# Patient Record
Sex: MALE
[De-identification: names, ages, dates, MRNs, and addresses within clinical notes are randomized per-mention and may not be internally consistent; named-entity substitution may affect disease eponyms.]

---

## 2024-02-28 PROBLEM — Z00.00 ENCOUNTER FOR PREVENTIVE HEALTH EXAMINATION: Status: ACTIVE | Noted: 2024-02-28

## 2024-03-19 ENCOUNTER — APPOINTMENT (OUTPATIENT)
Dept: OTHER | Facility: CLINIC | Age: 84
End: 2024-03-19
Payer: COMMERCIAL

## 2024-03-19 DIAGNOSIS — Z04.9 ENCOUNTER FOR EXAMINATION AND OBSERVATION FOR UNSPECIFIED REASON: ICD-10-CM

## 2024-03-19 DIAGNOSIS — Z87.891 PERSONAL HISTORY OF NICOTINE DEPENDENCE: ICD-10-CM

## 2024-03-19 PROCEDURE — 99387 INIT PM E/M NEW PAT 65+ YRS: CPT | Mod: 95

## 2024-03-19 RX ORDER — ICOSAPENT ETHYL 500 MG/1
0.5 CAPSULE ORAL
Refills: 0 | Status: ACTIVE | COMMUNITY
Start: 2024-03-19

## 2024-03-19 RX ORDER — ROSUVASTATIN CALCIUM 10 MG/1
10 TABLET, FILM COATED ORAL
Refills: 0 | Status: ACTIVE | COMMUNITY
Start: 2024-03-19

## 2024-03-19 RX ORDER — TAMSULOSIN HYDROCHLORIDE 0.4 MG/1
0.4 CAPSULE ORAL
Refills: 0 | Status: ACTIVE | COMMUNITY
Start: 2024-03-19

## 2024-03-19 RX ORDER — ENZALUTAMIDE 40 MG/1
40 CAPSULE ORAL
Refills: 0 | Status: ACTIVE | COMMUNITY
Start: 2024-03-19

## 2024-03-19 RX ORDER — PREDNISONE 50 MG/1
50 TABLET ORAL
Refills: 0 | Status: ACTIVE | COMMUNITY
Start: 2024-03-19

## 2025-05-14 ENCOUNTER — INPATIENT (INPATIENT)
Facility: HOSPITAL | Age: 85
LOS: 4 days | Discharge: HOME | DRG: 684 | End: 2025-05-19
Attending: INTERNAL MEDICINE | Admitting: INTERNAL MEDICINE
Payer: MEDICARE

## 2025-05-14 VITALS
SYSTOLIC BLOOD PRESSURE: 136 MMHG | HEIGHT: 71 IN | TEMPERATURE: 97 F | RESPIRATION RATE: 19 BRPM | HEART RATE: 76 BPM | WEIGHT: 139.99 LBS | OXYGEN SATURATION: 100 % | DIASTOLIC BLOOD PRESSURE: 80 MMHG

## 2025-05-14 DIAGNOSIS — N17.9 ACUTE KIDNEY FAILURE, UNSPECIFIED: ICD-10-CM

## 2025-05-14 LAB
ALBUMIN SERPL ELPH-MCNC: 3.9 G/DL — SIGNIFICANT CHANGE UP (ref 3.3–5)
ALP SERPL-CCNC: 388 U/L — HIGH (ref 40–120)
ALT FLD-CCNC: <5 U/L — LOW (ref 10–45)
ANION GAP SERPL CALC-SCNC: 15 MMOL/L — SIGNIFICANT CHANGE UP (ref 5–17)
APPEARANCE UR: CLEAR — SIGNIFICANT CHANGE UP
APTT BLD: 31.4 SEC — SIGNIFICANT CHANGE UP (ref 26.1–36.8)
AST SERPL-CCNC: 15 U/L — SIGNIFICANT CHANGE UP (ref 10–40)
BACTERIA # UR AUTO: NEGATIVE /HPF — SIGNIFICANT CHANGE UP
BASOPHILS # BLD AUTO: 0.02 K/UL — SIGNIFICANT CHANGE UP (ref 0–0.2)
BASOPHILS NFR BLD AUTO: 0.6 % — SIGNIFICANT CHANGE UP (ref 0–2)
BILIRUB SERPL-MCNC: 0.2 MG/DL — SIGNIFICANT CHANGE UP (ref 0.2–1.2)
BILIRUB UR-MCNC: NEGATIVE — SIGNIFICANT CHANGE UP
BUN SERPL-MCNC: 47 MG/DL — HIGH (ref 7–23)
CALCIUM SERPL-MCNC: 9.6 MG/DL — SIGNIFICANT CHANGE UP (ref 8.4–10.5)
CAST: 0 /LPF — SIGNIFICANT CHANGE UP (ref 0–4)
CHLORIDE SERPL-SCNC: 103 MMOL/L — SIGNIFICANT CHANGE UP (ref 96–108)
CO2 SERPL-SCNC: 22 MMOL/L — SIGNIFICANT CHANGE UP (ref 22–31)
COLOR SPEC: YELLOW — SIGNIFICANT CHANGE UP
CREAT SERPL-MCNC: 1.47 MG/DL — HIGH (ref 0.5–1.3)
DIFF PNL FLD: ABNORMAL
EGFR: 46 ML/MIN/1.73M2 — LOW
EGFR: 46 ML/MIN/1.73M2 — LOW
EOSINOPHIL # BLD AUTO: 0.02 K/UL — SIGNIFICANT CHANGE UP (ref 0–0.5)
EOSINOPHIL NFR BLD AUTO: 0.6 % — SIGNIFICANT CHANGE UP (ref 0–6)
FLUAV AG NPH QL: SIGNIFICANT CHANGE UP
FLUBV AG NPH QL: SIGNIFICANT CHANGE UP
GAS PNL BLDV: SIGNIFICANT CHANGE UP
GLUCOSE SERPL-MCNC: 97 MG/DL — SIGNIFICANT CHANGE UP (ref 70–99)
GLUCOSE UR QL: NEGATIVE MG/DL — SIGNIFICANT CHANGE UP
HCT VFR BLD CALC: 25.2 % — LOW (ref 39–50)
HGB BLD-MCNC: 8.3 G/DL — LOW (ref 13–17)
IMM GRANULOCYTES NFR BLD AUTO: 0.6 % — SIGNIFICANT CHANGE UP (ref 0–0.9)
INR BLD: 0.88 RATIO — SIGNIFICANT CHANGE UP (ref 0.85–1.16)
KETONES UR QL: NEGATIVE MG/DL — SIGNIFICANT CHANGE UP
LEUKOCYTE ESTERASE UR-ACNC: NEGATIVE — SIGNIFICANT CHANGE UP
LYMPHOCYTES # BLD AUTO: 1.11 K/UL — SIGNIFICANT CHANGE UP (ref 1–3.3)
LYMPHOCYTES # BLD AUTO: 31.7 % — SIGNIFICANT CHANGE UP (ref 13–44)
MAGNESIUM SERPL-MCNC: 2.5 MG/DL — SIGNIFICANT CHANGE UP (ref 1.6–2.6)
MCHC RBC-ENTMCNC: 30.9 PG — SIGNIFICANT CHANGE UP (ref 27–34)
MCHC RBC-ENTMCNC: 32.9 G/DL — SIGNIFICANT CHANGE UP (ref 32–36)
MCV RBC AUTO: 93.7 FL — SIGNIFICANT CHANGE UP (ref 80–100)
MONOCYTES # BLD AUTO: 0.35 K/UL — SIGNIFICANT CHANGE UP (ref 0–0.9)
MONOCYTES NFR BLD AUTO: 10 % — SIGNIFICANT CHANGE UP (ref 2–14)
NEUTROPHILS # BLD AUTO: 1.98 K/UL — SIGNIFICANT CHANGE UP (ref 1.8–7.4)
NEUTROPHILS NFR BLD AUTO: 56.5 % — SIGNIFICANT CHANGE UP (ref 43–77)
NITRITE UR-MCNC: NEGATIVE — SIGNIFICANT CHANGE UP
NRBC BLD AUTO-RTO: 0 /100 WBCS — SIGNIFICANT CHANGE UP (ref 0–0)
NT-PROBNP SERPL-SCNC: 1828 PG/ML — HIGH (ref 0–300)
PH UR: 7 — SIGNIFICANT CHANGE UP (ref 5–8)
PHOSPHATE SERPL-MCNC: 2.8 MG/DL — SIGNIFICANT CHANGE UP (ref 2.5–4.5)
PLATELET # BLD AUTO: 107 K/UL — LOW (ref 150–400)
POTASSIUM SERPL-MCNC: 4.3 MMOL/L — SIGNIFICANT CHANGE UP (ref 3.5–5.3)
POTASSIUM SERPL-SCNC: 4.3 MMOL/L — SIGNIFICANT CHANGE UP (ref 3.5–5.3)
PROT SERPL-MCNC: 6.7 G/DL — SIGNIFICANT CHANGE UP (ref 6–8.3)
PROT UR-MCNC: 100 MG/DL
PROTHROM AB SERPL-ACNC: 10 SEC — SIGNIFICANT CHANGE UP (ref 9.9–13.4)
RBC # BLD: 2.69 M/UL — LOW (ref 4.2–5.8)
RBC # FLD: 19 % — HIGH (ref 10.3–14.5)
RBC CASTS # UR COMP ASSIST: 12 /HPF — HIGH (ref 0–4)
RSV RNA NPH QL NAA+NON-PROBE: SIGNIFICANT CHANGE UP
SARS-COV-2 RNA SPEC QL NAA+PROBE: SIGNIFICANT CHANGE UP
SODIUM SERPL-SCNC: 140 MMOL/L — SIGNIFICANT CHANGE UP (ref 135–145)
SOURCE RESPIRATORY: SIGNIFICANT CHANGE UP
SP GR SPEC: 1.02 — SIGNIFICANT CHANGE UP (ref 1–1.03)
SQUAMOUS # UR AUTO: 2 /HPF — SIGNIFICANT CHANGE UP (ref 0–5)
TROPONIN T, HIGH SENSITIVITY RESULT: 43 NG/L — SIGNIFICANT CHANGE UP (ref 0–51)
UROBILINOGEN FLD QL: 0.2 MG/DL — SIGNIFICANT CHANGE UP (ref 0.2–1)
WBC # BLD: 3.5 K/UL — LOW (ref 3.8–10.5)
WBC # FLD AUTO: 3.5 K/UL — LOW (ref 3.8–10.5)
WBC UR QL: 1 /HPF — SIGNIFICANT CHANGE UP (ref 0–5)

## 2025-05-14 PROCEDURE — 93010 ELECTROCARDIOGRAM REPORT: CPT

## 2025-05-14 PROCEDURE — 71046 X-RAY EXAM CHEST 2 VIEWS: CPT | Mod: 26

## 2025-05-14 PROCEDURE — 99285 EMERGENCY DEPT VISIT HI MDM: CPT | Mod: FS

## 2025-05-14 PROCEDURE — 74177 CT ABD & PELVIS W/CONTRAST: CPT | Mod: 26

## 2025-05-14 PROCEDURE — 71275 CT ANGIOGRAPHY CHEST: CPT | Mod: 26

## 2025-05-14 PROCEDURE — 72125 CT NECK SPINE W/O DYE: CPT | Mod: 26

## 2025-05-14 PROCEDURE — 70450 CT HEAD/BRAIN W/O DYE: CPT | Mod: 26

## 2025-05-14 RX ADMIN — Medication 1000 MILLILITER(S): at 23:36

## 2025-05-14 NOTE — ED PROVIDER NOTE - CLINICAL SUMMARY MEDICAL DECISION MAKING FREE TEXT BOX
attending ashley - weakness and sob and falls in ca pt. rule out pe. consider infection. check lytes. ct head and xray chest. likely admit. attending ashley - weakness and sob and falls in ca pt. rule out pe. consider infection. check lytes. ct head and xray chest. likely admit.  I read ekg as nsr rate 74, no st elevation or depression, qtc 446, narrow qrs. normal axis, septal qs.

## 2025-05-14 NOTE — ED ADULT NURSE NOTE - OBJECTIVE STATEMENT
84 y/o male A&Ox3, ambulatory with a walker, bibems from [facility] c/o mechanical fall and weakness, sob. as per pt, fell x2 yesterday and has serosanguineous abrasion on forehead/ top of head and abrasion on left arm. PMH prostate ca, bone ca, HTN, high cholesterol, pt has NKDA. pt denies any chest pain, fevers, chills, N/V/D. pt dressed in hospital gown, bed at lowest position and call bell within reach.

## 2025-05-14 NOTE — ED PROVIDER NOTE - ATTENDING CONTRIBUTION TO CARE
attending ashley - weakness and sob and falls in ca pt. rule out pe. consider infection. check lytes. ct head and xray chest. likely admit.  I read ekg as nsr rate 74, no st elevation or depression, qtc 446, narrow qrs. normal axis, septal qs.  amari on labs  ct to eval for hydro

## 2025-05-14 NOTE — ED ADULT NURSE REASSESSMENT NOTE - NS ED NURSE REASSESS COMMENT FT1
pt brought to bathroom via wheelchair, pt able to ambulate to wheelchair with steady gait and no complaints of dizziness/SOB, no complaints at this time, pending dispo

## 2025-05-14 NOTE — ED ADULT NURSE NOTE - NSFALLRISKINTERV_ED_ALL_ED

## 2025-05-14 NOTE — ED PROVIDER NOTE - OBJECTIVE STATEMENT
85m hx prostate ca and unspecified bone ca pw weakness and falling. pt says he fell x2 yesterday, left leg gave out. pt fell at 1pm yesterday and then again at 3pm yesterday. when fell, didn't have loc. has wounds on head from a fall a month ago that opened up again yesterday. [facility] and they told him to go to the er. no cp, yes sob, no vomiting, no fever. 85m hx prostate ca and unspecified bone ca pw weakness and falling. pt says he fell x2 yesterday, left leg gave out. pt fell at 1pm yesterday and then again at 3pm yesterday. when fell, didn't have loc. has wounds on head from a fall a month ago that opened up again yesterday. [facility] and they told him to go to the er. no cp, yes sob, no vomiting, no fever.    PA Note: 85 year old male with history of prostate cancer with bony mets presents to the ED from [facility] cancer center for evaluation of lethargy and 2 falls.  Patient reports that yesterday fell twice secondary to his left leg giving out.  He reports that he has had issues with the left leg for "a while", yesterday leg gave out causing him to fall.  Reports he scraped his head and his left elbow.  Normally does not walk with any assistive devices but today use a cane as he was scared he may fall again.  He had a scheduled follow-up with his doctors today and discussed falls return to the ED.  He reports that he is felt very tired and fatigued over the last week.  He denies headache, dizziness, chest pain, shortness of breath, abdominal pain nausea or vomiting.  Patient reports that he always has a slight cough and shortness of breath related to smoking.

## 2025-05-14 NOTE — ED PROVIDER NOTE - PHYSICAL EXAMINATION
Gen: Alert, NAD  Head: NC, AT   Eyes: PERRL, EOMI, normal lids/conjunctiva  ENT: normal hearing, patent oropharynx without erythema/exudate, uvula midline  Neck: supple, no tenderness, Trachea midline  Pulm: right basilar crackles  CV: RRR, no M/R/G, 2+ radial and dp pulses bl, no edema  Abd: soft, NT/ND, +BS, no hepatosplenomegaly  Mskel: extremities x4 with normal ROM and no joint effusions. no ctl spine ttp.   Skin: 2 half dollar sized wounds on the top of scalp   Neuro: AAOx3, no sensory/motor deficits, CN 2-12 intact Normal

## 2025-05-14 NOTE — ED PROVIDER NOTE - NS ED ATTENDING STATEMENT MOD
I have seen and examined this patient and fully participated in the care of this patient as the teaching attending.  The service was shared with the ANY.  I reviewed and verified the documentation.

## 2025-05-14 NOTE — ED ADULT TRIAGE NOTE - CHIEF COMPLAINT QUOTE
lethargy and generalized weakness x1 week, fall yesterday denies LOC and AC use  sent from the [facility]   hx of prostate CA

## 2025-05-14 NOTE — ED PROVIDER NOTE - PROGRESS NOTE DETAILS
[facility] for [facility] cancer, mets to the bone currently on radiation therapy, presenting from [facility] with fatigue and recent falls.  Here dimer elevated patient with leukocytosis.  BUN/creatinine elevated 47/1.47.  CT head and CT C-spine negative for acute pathology.  CTA negative for acute PE however did show severe left-sided hydronephrosis and right sided hydronephrosis.  Given this finding CT abdomen pelvis was ordered.  Patient admitted to [facility] for further management of NHAN

## 2025-05-15 DIAGNOSIS — E78.5 HYPERLIPIDEMIA, UNSPECIFIED: ICD-10-CM

## 2025-05-15 DIAGNOSIS — N28.9 DISORDER OF KIDNEY AND URETER, UNSPECIFIED: ICD-10-CM

## 2025-05-15 DIAGNOSIS — N13.9 OBSTRUCTIVE AND REFLUX UROPATHY, UNSPECIFIED: ICD-10-CM

## 2025-05-15 DIAGNOSIS — R93.89 ABNORMAL FINDINGS ON DIAGNOSTIC IMAGING OF OTHER SPECIFIED BODY STRUCTURES: ICD-10-CM
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
## 2025-05-15 DIAGNOSIS — R91.8 OTHER NONSPECIFIC ABNORMAL FINDING OF LUNG FIELD: ICD-10-CM

## 2025-05-15 DIAGNOSIS — C61 MALIGNANT NEOPLASM OF PROSTATE: ICD-10-CM

## 2025-05-15 DIAGNOSIS — I10 ESSENTIAL (PRIMARY) HYPERTENSION: ICD-10-CM

## 2025-05-15 DIAGNOSIS — K59.00 CONSTIPATION, UNSPECIFIED: ICD-10-CM

## 2025-05-15 DIAGNOSIS — Z79.899 OTHER LONG TERM (CURRENT) DRUG THERAPY: ICD-10-CM

## 2025-05-15 DIAGNOSIS — D61.818 OTHER PANCYTOPENIA: ICD-10-CM

## 2025-05-15 DIAGNOSIS — Z29.9 ENCOUNTER FOR PROPHYLACTIC MEASURES, UNSPECIFIED: ICD-10-CM

## 2025-05-15 LAB
A1C WITH ESTIMATED AVERAGE GLUCOSE RESULT: 5.9 % — HIGH (ref 4–5.6)
ALBUMIN SERPL ELPH-MCNC: 2.9 G/DL — LOW (ref 3.3–5)
ALP SERPL-CCNC: 341 U/L — HIGH (ref 40–120)
ALT FLD-CCNC: <5 U/L — LOW (ref 10–45)
ANION GAP SERPL CALC-SCNC: 14 MMOL/L — SIGNIFICANT CHANGE UP (ref 5–17)
AST SERPL-CCNC: 20 U/L — SIGNIFICANT CHANGE UP (ref 10–40)
BASOPHILS # BLD AUTO: 0.01 K/UL — SIGNIFICANT CHANGE UP (ref 0–0.2)
BASOPHILS NFR BLD AUTO: 0.3 % — SIGNIFICANT CHANGE UP (ref 0–2)
BILIRUB SERPL-MCNC: 0.2 MG/DL — SIGNIFICANT CHANGE UP (ref 0.2–1.2)
BUN SERPL-MCNC: 40 MG/DL — HIGH (ref 7–23)
CALCIUM SERPL-MCNC: 8.8 MG/DL — SIGNIFICANT CHANGE UP (ref 8.4–10.5)
CALCIUM SERPL-MCNC: 9 MG/DL — SIGNIFICANT CHANGE UP (ref 8.4–10.5)
CHLORIDE SERPL-SCNC: 107 MMOL/L — SIGNIFICANT CHANGE UP (ref 96–108)
CO2 SERPL-SCNC: 16 MMOL/L — LOW (ref 22–31)
CREAT ?TM UR-MCNC: 24 MG/DL — SIGNIFICANT CHANGE UP
CREAT SERPL-MCNC: 1.26 MG/DL — SIGNIFICANT CHANGE UP (ref 0.5–1.3)
EGFR: 56 ML/MIN/1.73M2 — LOW
EGFR: 56 ML/MIN/1.73M2 — LOW
EOSINOPHIL # BLD AUTO: 0.03 K/UL — SIGNIFICANT CHANGE UP (ref 0–0.5)
EOSINOPHIL NFR BLD AUTO: 0.8 % — SIGNIFICANT CHANGE UP (ref 0–6)
ESTIMATED AVERAGE GLUCOSE: 123 MG/DL — HIGH (ref 68–114)
FERRITIN SERPL-MCNC: 1272 NG/ML — HIGH (ref 30–400)
FOLATE SERPL-MCNC: 5.6 NG/ML — SIGNIFICANT CHANGE UP
GLUCOSE SERPL-MCNC: 98 MG/DL — SIGNIFICANT CHANGE UP (ref 70–99)
HCT VFR BLD CALC: 22.5 % — LOW (ref 39–50)
HGB BLD-MCNC: 7.1 G/DL — LOW (ref 13–17)
IMM GRANULOCYTES NFR BLD AUTO: 1.1 % — HIGH (ref 0–0.9)
IRON SATN MFR SERPL: 37 % — SIGNIFICANT CHANGE UP (ref 16–55)
IRON SATN MFR SERPL: 81 UG/DL — SIGNIFICANT CHANGE UP (ref 45–165)
LEGIONELLA AG UR QL: NEGATIVE — SIGNIFICANT CHANGE UP
LYMPHOCYTES # BLD AUTO: 0.9 K/UL — LOW (ref 1–3.3)
LYMPHOCYTES # BLD AUTO: 25.2 % — SIGNIFICANT CHANGE UP (ref 13–44)
MAGNESIUM SERPL-MCNC: 2.2 MG/DL — SIGNIFICANT CHANGE UP (ref 1.6–2.6)
MCHC RBC-ENTMCNC: 29.7 PG — SIGNIFICANT CHANGE UP (ref 27–34)
MCHC RBC-ENTMCNC: 31.6 G/DL — LOW (ref 32–36)
MCV RBC AUTO: 94.1 FL — SIGNIFICANT CHANGE UP (ref 80–100)
MONOCYTES # BLD AUTO: 0.37 K/UL — SIGNIFICANT CHANGE UP (ref 0–0.9)
MONOCYTES NFR BLD AUTO: 10.4 % — SIGNIFICANT CHANGE UP (ref 2–14)
MRSA PCR RESULT.: SIGNIFICANT CHANGE UP
NEUTROPHILS # BLD AUTO: 2.22 K/UL — SIGNIFICANT CHANGE UP (ref 1.8–7.4)
NEUTROPHILS NFR BLD AUTO: 62.2 % — SIGNIFICANT CHANGE UP (ref 43–77)
NRBC BLD AUTO-RTO: 0 /100 WBCS — SIGNIFICANT CHANGE UP (ref 0–0)
PHOSPHATE SERPL-MCNC: 3 MG/DL — SIGNIFICANT CHANGE UP (ref 2.5–4.5)
PLATELET # BLD AUTO: 86 K/UL — LOW (ref 150–400)
POTASSIUM SERPL-MCNC: 4.3 MMOL/L — SIGNIFICANT CHANGE UP (ref 3.5–5.3)
POTASSIUM SERPL-SCNC: 4.3 MMOL/L — SIGNIFICANT CHANGE UP (ref 3.5–5.3)
PROCALCITONIN SERPL-MCNC: 0.08 NG/ML — SIGNIFICANT CHANGE UP (ref 0.02–0.1)
PROT ?TM UR-MCNC: 28 MG/DL — HIGH (ref 0–12)
PROT SERPL-MCNC: 5.5 G/DL — LOW (ref 6–8.3)
PROT/CREAT UR-RTO: 1.2 RATIO — HIGH (ref 0–0.2)
PTH-INTACT FLD-MCNC: 59 PG/ML — SIGNIFICANT CHANGE UP (ref 15–65)
RBC # BLD: 2.39 M/UL — LOW (ref 4.2–5.8)
RBC # BLD: 2.39 M/UL — LOW (ref 4.2–5.8)
RBC # FLD: 18.7 % — HIGH (ref 10.3–14.5)
RETICS #: 65.7 K/UL — SIGNIFICANT CHANGE UP (ref 25–125)
RETICS/RBC NFR: 2.8 % — HIGH (ref 0.5–2.5)
S AUREUS DNA NOSE QL NAA+PROBE: SIGNIFICANT CHANGE UP
S PNEUM AG UR QL: NEGATIVE — SIGNIFICANT CHANGE UP
SODIUM SERPL-SCNC: 137 MMOL/L — SIGNIFICANT CHANGE UP (ref 135–145)
SODIUM UR-SCNC: 88 MMOL/L — SIGNIFICANT CHANGE UP
TIBC SERPL-MCNC: 219 UG/DL — LOW (ref 220–430)
UIBC SERPL-MCNC: 138 UG/DL — SIGNIFICANT CHANGE UP (ref 110–370)
VIT B12 SERPL-MCNC: >2000 PG/ML — HIGH (ref 232–1245)
WBC # BLD: 3.57 K/UL — LOW (ref 3.8–10.5)
WBC # FLD AUTO: 3.57 K/UL — LOW (ref 3.8–10.5)

## 2025-05-15 PROCEDURE — 99221 1ST HOSP IP/OBS SF/LOW 40: CPT

## 2025-05-15 PROCEDURE — 73070 X-RAY EXAM OF ELBOW: CPT | Mod: 26,LT

## 2025-05-15 PROCEDURE — 99497 ADVNCD CARE PLAN 30 MIN: CPT | Mod: 25

## 2025-05-15 PROCEDURE — 99223 1ST HOSP IP/OBS HIGH 75: CPT | Mod: 25

## 2025-05-15 RX ORDER — ALBUTEROL SULFATE 2.5 MG/3ML
0 VIAL, NEBULIZER (ML) INHALATION
Refills: 0 | DISCHARGE

## 2025-05-15 RX ORDER — MEGESTROL ACETATE 40 MG
1 TABLET ORAL
Refills: 0 | DISCHARGE

## 2025-05-15 RX ORDER — POLYETHYLENE GLYCOL 3350 17 G/17G
17 POWDER, FOR SOLUTION ORAL DAILY
Refills: 0 | Status: DISCONTINUED | OUTPATIENT
Start: 2025-05-15 | End: 2025-05-19

## 2025-05-15 RX ORDER — ROSUVASTATIN CALCIUM 20 MG/1
10 TABLET, FILM COATED ORAL AT BEDTIME
Refills: 0 | Status: DISCONTINUED | OUTPATIENT
Start: 2025-05-15 | End: 2025-05-19

## 2025-05-15 RX ORDER — SENNA 187 MG
2 TABLET ORAL AT BEDTIME
Refills: 0 | Status: DISCONTINUED | OUTPATIENT
Start: 2025-05-15 | End: 2025-05-19

## 2025-05-15 RX ORDER — METFORMIN HYDROCHLORIDE 850 MG/1
1 TABLET ORAL
Refills: 0 | DISCHARGE

## 2025-05-15 RX ORDER — TAMSULOSIN HYDROCHLORIDE 0.4 MG/1
0.4 CAPSULE ORAL
Refills: 0 | Status: DISCONTINUED | OUTPATIENT
Start: 2025-05-15 | End: 2025-05-19

## 2025-05-15 RX ORDER — ROSUVASTATIN CALCIUM 20 MG/1
1 TABLET, FILM COATED ORAL
Refills: 0 | DISCHARGE

## 2025-05-15 RX ORDER — DARBEPOETIN ALFA 40 UG/ML
500 SOLUTION INTRAVENOUS; SUBCUTANEOUS
Refills: 0 | DISCHARGE

## 2025-05-15 RX ORDER — AZITHROMYCIN 250 MG
500 CAPSULE ORAL EVERY 24 HOURS
Refills: 0 | Status: DISCONTINUED | OUTPATIENT
Start: 2025-05-16 | End: 2025-05-17

## 2025-05-15 RX ORDER — AZITHROMYCIN 250 MG
500 CAPSULE ORAL ONCE
Refills: 0 | Status: COMPLETED | OUTPATIENT
Start: 2025-05-15 | End: 2025-05-15

## 2025-05-15 RX ORDER — DARBEPOETIN ALFA 40 UG/ML
0 SOLUTION INTRAVENOUS; SUBCUTANEOUS
Refills: 0 | DISCHARGE

## 2025-05-15 RX ORDER — ENZALUTAMIDE 40 MG/1
1 CAPSULE ORAL
Refills: 0 | DISCHARGE

## 2025-05-15 RX ORDER — CEFTRIAXONE 500 MG/1
1000 INJECTION, POWDER, FOR SOLUTION INTRAMUSCULAR; INTRAVENOUS EVERY 24 HOURS
Refills: 0 | Status: DISCONTINUED | OUTPATIENT
Start: 2025-05-16 | End: 2025-05-19

## 2025-05-15 RX ORDER — AZITHROMYCIN 250 MG
CAPSULE ORAL
Refills: 0 | Status: DISCONTINUED | OUTPATIENT
Start: 2025-05-15 | End: 2025-05-17

## 2025-05-15 RX ORDER — CEFTRIAXONE 500 MG/1
INJECTION, POWDER, FOR SOLUTION INTRAMUSCULAR; INTRAVENOUS
Refills: 0 | Status: DISCONTINUED | OUTPATIENT
Start: 2025-05-15 | End: 2025-05-19

## 2025-05-15 RX ORDER — METOPROLOL SUCCINATE 50 MG/1
1 TABLET, EXTENDED RELEASE ORAL
Refills: 0 | DISCHARGE

## 2025-05-15 RX ORDER — TAMSULOSIN HYDROCHLORIDE 0.4 MG/1
1 CAPSULE ORAL
Refills: 0 | DISCHARGE

## 2025-05-15 RX ORDER — LEUPROLIDE 42 MG/.37G
0 INJECTION, EMULSION SUBCUTANEOUS
Refills: 0 | DISCHARGE

## 2025-05-15 RX ORDER — QUETIAPINE FUMARATE 25 MG/1
1 TABLET ORAL
Refills: 0 | DISCHARGE

## 2025-05-15 RX ORDER — METOPROLOL SUCCINATE 50 MG/1
100 TABLET, EXTENDED RELEASE ORAL DAILY
Refills: 0 | Status: DISCONTINUED | OUTPATIENT
Start: 2025-05-15 | End: 2025-05-19

## 2025-05-15 RX ORDER — PENTOXIFYLLINE 100 %
0 POWDER (GRAM) MISCELLANEOUS
Refills: 0 | DISCHARGE

## 2025-05-15 RX ORDER — CEFTRIAXONE 500 MG/1
1000 INJECTION, POWDER, FOR SOLUTION INTRAMUSCULAR; INTRAVENOUS ONCE
Refills: 0 | Status: COMPLETED | OUTPATIENT
Start: 2025-05-15 | End: 2025-05-15

## 2025-05-15 RX ORDER — MEGESTROL ACETATE 40 MG
40 TABLET ORAL DAILY
Refills: 0 | Status: DISCONTINUED | OUTPATIENT
Start: 2025-05-15 | End: 2025-05-19

## 2025-05-15 RX ORDER — ENZALUTAMIDE 40 MG/1
160 CAPSULE ORAL DAILY
Refills: 0 | Status: DISCONTINUED | OUTPATIENT
Start: 2025-05-15 | End: 2025-05-19

## 2025-05-15 RX ORDER — HEPARIN SODIUM 1000 [USP'U]/ML
5000 INJECTION INTRAVENOUS; SUBCUTANEOUS EVERY 8 HOURS
Refills: 0 | Status: DISCONTINUED | OUTPATIENT
Start: 2025-05-15 | End: 2025-05-19

## 2025-05-15 RX ORDER — LEUPROLIDE 42 MG/.37G
7.5 INJECTION, EMULSION SUBCUTANEOUS
Refills: 0 | DISCHARGE

## 2025-05-15 RX ORDER — ALBUTEROL SULFATE 2.5 MG/3ML
2 VIAL, NEBULIZER (ML) INHALATION
Refills: 0 | DISCHARGE

## 2025-05-15 RX ORDER — PREDNISONE 20 MG/1
1 TABLET ORAL
Refills: 0 | DISCHARGE

## 2025-05-15 RX ORDER — BICALUTAMIDE 50 MG/1
1 TABLET, FILM COATED ORAL
Refills: 0 | DISCHARGE

## 2025-05-15 RX ORDER — TAMSULOSIN HYDROCHLORIDE 0.4 MG/1
0 CAPSULE ORAL
Refills: 0 | DISCHARGE

## 2025-05-15 RX ORDER — ACETAMINOPHEN 500 MG/5ML
650 LIQUID (ML) ORAL EVERY 6 HOURS
Refills: 0 | Status: DISCONTINUED | OUTPATIENT
Start: 2025-05-15 | End: 2025-05-19

## 2025-05-15 RX ORDER — ENZALUTAMIDE 40 MG/1
4 CAPSULE ORAL
Refills: 0 | DISCHARGE

## 2025-05-15 RX ORDER — BICALUTAMIDE 50 MG/1
50 TABLET, FILM COATED ORAL DAILY
Refills: 0 | Status: DISCONTINUED | OUTPATIENT
Start: 2025-05-15 | End: 2025-05-17

## 2025-05-15 RX ORDER — QUETIAPINE FUMARATE 25 MG/1
50 TABLET ORAL AT BEDTIME
Refills: 0 | Status: DISCONTINUED | OUTPATIENT
Start: 2025-05-15 | End: 2025-05-19

## 2025-05-15 RX ADMIN — HEPARIN SODIUM 5000 UNIT(S): 1000 INJECTION INTRAVENOUS; SUBCUTANEOUS at 05:06

## 2025-05-15 RX ADMIN — TAMSULOSIN HYDROCHLORIDE 0.4 MILLIGRAM(S): 0.4 CAPSULE ORAL at 17:05

## 2025-05-15 RX ADMIN — TAMSULOSIN HYDROCHLORIDE 0.4 MILLIGRAM(S): 0.4 CAPSULE ORAL at 05:06

## 2025-05-15 RX ADMIN — HEPARIN SODIUM 5000 UNIT(S): 1000 INJECTION INTRAVENOUS; SUBCUTANEOUS at 13:05

## 2025-05-15 RX ADMIN — HEPARIN SODIUM 5000 UNIT(S): 1000 INJECTION INTRAVENOUS; SUBCUTANEOUS at 21:15

## 2025-05-15 RX ADMIN — ROSUVASTATIN CALCIUM 10 MILLIGRAM(S): 20 TABLET, FILM COATED ORAL at 21:04

## 2025-05-15 RX ADMIN — Medication 250 MILLIGRAM(S): at 04:44

## 2025-05-15 RX ADMIN — METOPROLOL SUCCINATE 100 MILLIGRAM(S): 50 TABLET, EXTENDED RELEASE ORAL at 05:06

## 2025-05-15 RX ADMIN — Medication 2 TABLET(S): at 21:05

## 2025-05-15 RX ADMIN — ENZALUTAMIDE 160 MILLIGRAM(S): 40 CAPSULE ORAL at 21:34

## 2025-05-15 RX ADMIN — Medication 2 TABLET(S): at 04:44

## 2025-05-15 RX ADMIN — POLYETHYLENE GLYCOL 3350 17 GRAM(S): 17 POWDER, FOR SOLUTION ORAL at 04:44

## 2025-05-15 RX ADMIN — CEFTRIAXONE 100 MILLIGRAM(S): 500 INJECTION, POWDER, FOR SOLUTION INTRAMUSCULAR; INTRAVENOUS at 04:44

## 2025-05-15 RX ADMIN — QUETIAPINE FUMARATE 50 MILLIGRAM(S): 25 TABLET ORAL at 21:04

## 2025-05-15 NOTE — H&P ADULT - PROBLEM SELECTOR PLAN 5
CTH/C-spine w/o acute finding  LE strength/sensation intact, no spinal ttp, no urinary/fecal incontinence   - PT eval  - fall precautions  - obtain XR L elbow r/o fx  - monitor exam, consider further imaging to eval LLE giving out

## 2025-05-15 NOTE — PHYSICAL THERAPY INITIAL EVALUATION ADULT - ACTIVE RANGE OF MOTION EXAMINATION, REHAB EVAL
aashish. upper extremity Active ROM was WNL (within normal limits)/bilateral lower extremity Active ROM was WNL (within normal limits)

## 2025-05-15 NOTE — H&P ADULT - PROBLEM SELECTOR PLAN 2
w/o baseline to compare, c/f obstructive etiology as above  proBNP and troponin elevation consider in this context (euvolemic-appearing and w/o CP/palpitations/acute SOB)  - obtain Ustudies, monitor BMP/UOP, dose per renal fn, avoid toxins

## 2025-05-15 NOTE — CONSULT NOTE ADULT - ASSESSMENT
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

## 2025-05-15 NOTE — CONSULT NOTE ADULT - ASSESSMENT
Stage IVB prostate cancer  -- follows with [place] [facility] [place] Aleman  --on Lupron + Xtandi (160mg QD) + Pluvicto  --Recently completed three doses of Pluvicto, tolerating well. 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     Anemia   --2/2 malignancy and treatment  --baseline 7.8-11.2  --no nutritional deficiencies noted on outpatient lab work from 5/14  --s/p Aranesp 500 mcg on 5/14      this note is incomplete    will follow,    Kristal Alvarez NP  Hematology/ Oncology  [facility]  [phone] (office)  [phone] (alt office)  Evenings and weekends please call MD on call or office   85M current smoker (1/2 PPD x 70y),  WTC exposure, HTN, HLD, anemia, osteonecrosis of jaw, adjustment d/o, constipation, epididymo-orchitis, urinary retention, prostate cancer with mets to bone, fall (w/ head trauma), p/w falls    Stage IVB prostate cancer  -- follows with Oncologist at [facility] Dr. Aleman  --on Lupron + Xtandi (160mg QD) + Pluvicto  --Recently completed three doses of Pluvicto, tolerating well. 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

## 2025-05-15 NOTE — H&P ADULT - ASSESSMENT
85M current smoker (1/2 PPD x 70y), WTC exposure, HTN, HLD, anemia, osteonecrosis of jaw, adjustment d/o, constipation, epididymo-orchitis, urinary retention, prostate cancer with mets to bone, fall (w/ head trauma), p/w falls found to have b/l hydronephrosis with c/f bladder mass, abnormal renal fn w/o baseline to compare, and r/o PNA given RLL 1.3 cm nodular consolidative opacity

## 2025-05-15 NOTE — H&P ADULT - PROBLEM SELECTOR PLAN 1
Bilateral hydroureteronephrosis left-greater-than-right which appears secondary to a mass at the base of the bladder. This mass baby either bladder or prostate in origin  - uro and onc c/s in AM  - insert indwelling urinary catheter ON, monitor Is/Os

## 2025-05-15 NOTE — H&P ADULT - PROBLEM SELECTOR PLAN 11
- pt w/o full list of meds, extrapolated from list/Surescripts/outpt record however team will need to contact pharmacy in AM to complete formal med rec (will continue meds which are more certain re dose/frequency/use in interim) - pt w/o full list of meds, extrapolated from list/Surescripts/outpt record however team will need to contact pharmacy in AM to complete formal med rec (will continue meds which are more certain re dose/frequency/use in interim, holding multiple meds ON including bicalutamide, Xtandi)

## 2025-05-15 NOTE — PATIENT PROFILE ADULT - FALL HARM RISK - HARM RISK INTERVENTIONS

## 2025-05-15 NOTE — H&P ADULT - NSHPPHYSICALEXAM_GEN_ALL_CORE
PHYSICAL EXAM:  GENERAL: NAD, well-groomed, well-developed, pleasant to interview  HEAD: Normocephalic +scalp w/ overlying dressing not saturated and nontender to palpation, additional scalp abrasion non-bleeding/nontender  EYES: PERRL, conjunctiva and sclera clear  ENMT: No tonsillar erythema, exudates, or enlargement; Moist mucous membranes  NECK: Supple w/o JVD or pain w/ ROM  NERVOUS SYSTEM: Alert & Oriented X4, Good concentration; Motor Strength 5/5 B/L upper and lower extremities. Sensation equal/intact b/l UE and LE. CN II-XII grossly intact.   CHEST/LUNG: Clear to auscultation bilaterally; No rales, rhonchi, wheezing, or rubs  HEART: Regular rate and rhythm; No murmurs, rubs, or gallops  ABDOMEN: Soft, Nontender, Nondistended; Bowel sounds present. No guarding, rebound tenderness, or rigidity. No CVA tenderness.   EXTREMITIES: 2+ Peripheral Pulses, No edema/warmth/erythema/tenderness to palpation b/l LE   MUSCULOSKELETAL: ecchymosis b/l UEs, R elbow w/o tenderness or pain w/ ROM and bandaged proximal RUE w/o saturation or tenderness. L elbow mild tenderness to palpation, no pain w/ < from: CT Abdomen and Pelvis w/ IV Cont (05.14.25 @ 22:03) >        < end of copied text >

## 2025-05-15 NOTE — H&P ADULT - NSHPADDITIONALINFOADULT_GEN_ALL_CORE
Urgent medical problems addressed overnight, comprehensive care to be assumed by day colleagues during course of admission.    Please refer to detailed radiology reports above, provide to patient upon discharge for presentation to PCP at which time abnormal findings not addressed inpatient can be followed up.    Case assigned to me overnight by hospitalist in charge Dr. Cruz, care to be assumed in AM by primary team Dr. Mario Fitzpatrick

## 2025-05-15 NOTE — CONSULT NOTE ADULT - ASSESSMENT
85M current smoker (1/2 PPD x 70y),  WTC exposure, HTN, HLD, anemia, osteonecrosis of jaw, adjustment d/o, constipation, epididymo-orchitis, urinary retention, prostate cancer with mets to bone, fall (w/ head trauma), p/w falls      85M current smoker (1/2 PPD x 70y),  WTC exposure, HTN, HLD, anemia, osteonecrosis of jaw, adjustment d/o, constipation, epididymo-orchitis, urinary retention, prostate cancer with mets to bone, fall (w/ head trauma), p/w falls  reduction of all 3 cell lines   Demetrius in light of severe hydro   Low HCO3 which can be seen in metabolic acidosis   Wide spread metastatic disease     1 Endo- Check random cortisol level in am to assess for adrenal insufficiency(was on Megace and prednisone)   2 Renal- Renal function did normalize.  He will need   One of the options include suarez and renal scan to assess the functionality of the hydro   Will need cysto  3 ID- IV abx   4 CVS- The lopressor should not be reducing the BP much         [facility]   [phone]

## 2025-05-15 NOTE — H&P ADULT - CONVERSATION DETAILS
Patient is alert and oriented to person, place, time, and situation. Extensively discussed findings and plan of care, all questions answered. With permission, broached topic of GOC introducing terms including CPR, intubation, and mechanical ventilation. Inquired as to whether patient was familiar with such terms, described indications and procedures associated with terms in detail. Patient states that at this time he has not contemplated this decision, and would prefer to discuss with his daughter in AM, and is amenable to palliative c/s to further discuss. He understands that in the interim he will remain FULL CODE, and that the team is available to discuss further should he wish.

## 2025-05-15 NOTE — H&P ADULT - PROBLEM SELECTOR PLAN 3
Right lower lobe 1.3 cm nodular consolidative opacity; this is indeterminate, but may be infectious/inflammatory in etiology  - given immunocompromise will cover w/ CTX, azithro pending Cx/MRSA/legionella/strep and obtain procal (may be confounded by abn renal fn), trend temp/WBC curve  - repeat CT chest outpatient to f/u RLL nodular consolidative opacity, mild scarring R lung base; pulm referral re emphysema (reporting baseline GUTIÉRREZ, cough) can d/w onc

## 2025-05-15 NOTE — H&P ADULT - NSHPLABSRESULTS_GEN_ALL_CORE
< from: CT Angio Chest PE Protocol w/ IV Cont (05.14.25 @ 19:29) >    LUNGS AND LARGE AIRWAYS: Patent central airways. Emphysema. Patchy 1.3 cm   nodular consolidative opacity at the right base (image 356, series 6)  PLEURA: No pleural effusion.  VESSELS: No pulmonary embolism. Aortic calcifications.  HEART: Heart size is normal. No pericardial effusion.  MEDIASTINUM AND CELIA: No lymphadenopathy.  CHEST WALL AND LOWER NECK: Within normal limits.  VISUALIZED UPPER ABDOMEN: Severe left hydronephrosis. Right   hydronephrosis and/or parapelvic cysts. Cholelithiasis.  BONES: Diffuse sclerotic lesions throughout the osseous structures.  IMPRESSION:  No acute pulmonary embolism.  Right lower lobe 1.3 cm nodular consolidative opacity; this is   indeterminate, but may be infectious/inflammatory in etiology. CT chest   follow-up in 3 months recommended to ensure clearing.  Severe left-sided hydronephrosis. Right hydronephrosis and/or parapelvic   cysts. Recommend dedicated CT abdomen and pelvis to further assess.  Diffuse sclerotic lesions throughout the osseous structures; this may   represent metastatic disease.    < from: CT Abdomen and Pelvis w/ IV Cont (05.14.25 @ 22:03) > (unable to fit entire read, please refer to PACS)     IMPRESSION:  1.   Bilateral hydroureteronephrosis left-greater-than-right which appears  secondary to a mass at the base of the bladder. This mass baby either   bladder  or prostate in origin. There is extensive bony metastatic disease.  2.   Abnormal appearance of the rectum which may be secondary to   proctitis or  tumor. Recommend follow-up.    < end of copied text >    EKG NSR 74BPM, QTc 446ms, TWI aVL w/o prior to compare

## 2025-05-15 NOTE — CONSULT NOTE ADULT - SUBJECTIVE AND OBJECTIVE BOX
Reason for consult: prostate ca    HPI:  w/o significant prior info in [place]/HIE to review    85M current smoker (1/2 PPD x 70y),  WTC exposure, HTN, HLD, anemia, osteonecrosis of jaw, adjustment d/o, constipation, epididymo-orchitis, urinary retention, prostate cancer with mets to bone, fall (w/ head trauma), p/w falls. AOx4, reports baseline fatigue, GUTIÉRREZ, chronic cough unchanged x months, in addition to LLE edema x months (reports evaluation outpatient w/ duplex unrevealing), with constipation x4d (passing flatus), when 2d ago while ambulating experienced LLE "giving out" resulting in falls w/o LOC though +head/L elbow trauma, able to crawl to chair and rise w/ ambulation via walker/cane afterward, referred to ED after presentation to scheduled outpatient f/u appt. Not endorsing dizziness, lightheadedness, visual disturbance, numbness (include LE or saddle anesthesia), weakness, (including LEs), tingling, rhinorrhea, odynophagia, CP, palpitations, change in cough/SOB/sputum, abd pain, n/v/d, melena, hematochezia, dysuria, hematuria, change in urinary frequency or odor, urinary/fecal incontinence, or other complaint. Unable to provide full medication list, has few meds in phone photos at times missing frequency/dose. Of note reports fall 1 mo ago for similar reason w/ head trauma, states that he did not report to hospital at that time.     - WBC 3.50, H/H 8.3/25.2 (normocytic), ; DDimer 1040; HST 50 -> 43, proBNP 1828; BUN/SCr 47/1.47 (GFR 46),   - UA protein, nonhemolyzed trace blood (12 RBC); COV/FluAB/RSV not det    CXR:   Clear lungs. Probable bony metastatic disease    CT HEAD:  No acute intracranial hemorrhage, mass effect, or midline shift.    CT CERVICAL SPINE:  No acute fracture or traumatic subluxation.  Multi-level degenerative changes.  Suspected bony metastatic disease.  Known "unspecified bone cancer, "    CTA chest PE protocol IC:   No acute pulmonary embolism.    Right lower lobe 1.3 cm nodular consolidative opacity; this is indeterminate, but may be infectious/inflammatory in etiology. CT chest follow-up in 3 months recommended to ensure clearing.    Severe left-sided hydronephrosis. Right hydronephrosis and/or parapelvic cysts. Recommend dedicated CT abdomen and pelvis to further assess.    Diffuse sclerotic lesions throughout the osseous structures; this may represent metastatic disease.    CT A/P IC (prelim):   1.   Bilateral hydroureteronephrosis left-greater-than-right which appears secondary to a mass at the base of the bladder. This mass baby either bladder or prostate in origin. There is extensive bony metastatic disease.  2.   Abnormal appearance of the rectum which may be secondary to proctitis or tumor. Recommend follow-up.    s/p NS 1L  (15 May 2025 02:43)      PAST MEDICAL & SURGICAL HISTORY:  Hypertension      HLD (hyperlipidemia)      Prostate cancer metastatic to bone      Osteonecrosis of jaw      Anemia      History of adjustment disorder      H/O constipation      Epididymo-orchitis      H/O urinary retention      No significant past surgical history          FAMILY HISTORY:  No pertinent family history in first degree relatives        Alochol: Denied  Smoking: Nonsmoker  Drug Use: Denied  Marital Status:         Allergies    No Known Allergies    Intolerances        MEDICATIONS  (STANDING):  azithromycin  IVPB      cefTRIAXone   IVPB      heparin   Injectable 5000 Unit(s) SubCutaneous every 8 hours  metoprolol succinate  milliGRAM(s) Oral daily  polyethylene glycol 3350 17 Gram(s) Oral daily  rosuvastatin 10 milliGRAM(s) Oral at bedtime  senna 2 Tablet(s) Oral at bedtime  tamsulosin 0.4 milliGRAM(s) Oral two times a day    MEDICATIONS  (PRN):  acetaminophen     Tablet .. 650 milliGRAM(s) Oral every 6 hours PRN Temp greater or equal to 38C (100.4F), Mild Pain (1 - 3)      ROS  No fever, sweats, chills  No epistaxis, HA, sore throat  No CP, SOB, cough, sputum  No n/v/d, abd pain, melena, hematochezia  No edema  No rash  No anxiety  No back pain, joint pain  No bleeding, bruising  No dysuria, hematuria    T(C): 36.5 (05-15-25 @ 04:10), Max: 36.6 (05-14-25 @ 18:47)  HR: 70 (05-15-25 @ 04:10) (70 - 83)  BP: 102/64 (05-15-25 @ 04:10) (102/64 - 147/77)  RR: 18 (05-15-25 @ 04:10) (15 - 19)  SpO2: 97% (05-15-25 @ 04:10) (95% - 100%)  Wt(kg): --    PE  NAD  Awake, alert  Anicteric, MMM  RRR  CTAB  Abd soft, NT, ND  No c/c/e  No rash grossly  FROM                          8.3    3.50  )-----------( 107      ( 14 May 2025 14:35 )             25.2       05-14    140  |  103  |  47[H]  ----------------------------<  97  4.3   |  22  |  1.47[H]    Ca    9.6      14 May 2025 14:35  Phos  2.8     05-14  Mg     2.5     05-14    TPro  6.7  /  Alb  3.9  /  TBili  0.2  /  DBili  x   /  AST  15  /  ALT  <5[L]  /  AlkPhos  388[H]  05-14   Reason for consult: prostate ca    HPI:  w/o significant prior info in [place]/HIE to review    85M current smoker (1/2 PPD x 70y),  WTC exposure, HTN, HLD, anemia, osteonecrosis of jaw, adjustment d/o, constipation, epididymo-orchitis, urinary retention, prostate cancer with mets to bone, fall (w/ head trauma), p/w falls. AOx4, reports baseline fatigue, GUTIÉRREZ, chronic cough unchanged x months, in addition to LLE edema x months (reports evaluation outpatient w/ duplex unrevealing), with constipation x4d (passing flatus), when 2d ago while ambulating experienced LLE "giving out" resulting in falls w/o LOC though +head/L elbow trauma, able to crawl to chair and rise w/ ambulation via walker/cane afterward, referred to ED after presentation to scheduled outpatient f/u appt. Not endorsing dizziness, lightheadedness, visual disturbance, numbness (include LE or saddle anesthesia), weakness, (including LEs), tingling, rhinorrhea, odynophagia, CP, palpitations, change in cough/SOB/sputum, abd pain, n/v/d, melena, hematochezia, dysuria, hematuria, change in urinary frequency or odor, urinary/fecal incontinence, or other complaint. Unable to provide full medication list, has few meds in phone photos at times missing frequency/dose. Of note reports fall 1 mo ago for similar reason w/ head trauma, states that he did not report to hospital at that time.     - WBC 3.50, H/H 8.3/25.2 (normocytic), ; DDimer 1040; HST 50 -> 43, proBNP 1828; BUN/SCr 47/1.47 (GFR 46),   - UA protein, nonhemolyzed trace blood (12 RBC); COV/FluAB/RSV not det    CXR:   Clear lungs. Probable bony metastatic disease    CT HEAD:  No acute intracranial hemorrhage, mass effect, or midline shift.    CT CERVICAL SPINE:  No acute fracture or traumatic subluxation.  Multi-level degenerative changes.  Suspected bony metastatic disease.  Known "unspecified bone cancer, "    CTA chest PE protocol IC:   No acute pulmonary embolism.    Right lower lobe 1.3 cm nodular consolidative opacity; this is indeterminate, but may be infectious/inflammatory in etiology. CT chest follow-up in 3 months recommended to ensure clearing.    Severe left-sided hydronephrosis. Right hydronephrosis and/or parapelvic cysts. Recommend dedicated CT abdomen and pelvis to further assess.    Diffuse sclerotic lesions throughout the osseous structures; this may represent metastatic disease.    CT A/P IC (prelim):   1.   Bilateral hydroureteronephrosis left-greater-than-right which appears secondary to a mass at the base of the bladder. This mass baby either bladder or prostate in origin. There is extensive bony metastatic disease.  2.   Abnormal appearance of the rectum which may be secondary to proctitis or tumor. Recommend follow-up.    s/p NS 1L  (15 May 2025 02:43)      PAST MEDICAL & SURGICAL HISTORY:  Hypertension      HLD (hyperlipidemia)      Prostate cancer metastatic to bone      Osteonecrosis of jaw      Anemia      History of adjustment disorder      H/O constipation      Epididymo-orchitis      H/O urinary retention      No significant past surgical history          FAMILY HISTORY:  No pertinent family history in first degree relatives        Alochol: Denied  Smoking: Nonsmoker  Drug Use: Denied  Marital Status:         Allergies    No Known Allergies    Intolerances        MEDICATIONS  (STANDING):  azithromycin  IVPB      cefTRIAXone   IVPB      heparin   Injectable 5000 Unit(s) SubCutaneous every 8 hours  metoprolol succinate  milliGRAM(s) Oral daily  polyethylene glycol 3350 17 Gram(s) Oral daily  rosuvastatin 10 milliGRAM(s) Oral at bedtime  senna 2 Tablet(s) Oral at bedtime  tamsulosin 0.4 milliGRAM(s) Oral two times a day    MEDICATIONS  (PRN):  acetaminophen     Tablet .. 650 milliGRAM(s) Oral every 6 hours PRN Temp greater or equal to 38C (100.4F), Mild Pain (1 - 3)      ROS  No fever, sweats, chills  No epistaxis, HA, sore throat  No CP, SOB, cough, sputum  No n/v/d, abd pain, melena, hematochezia  No edema  No rash  No anxiety  No back pain, joint pain  No bleeding, bruising  No dysuria, hematuria    T(C): 36.5 (05-15-25 @ 04:10), Max: 36.6 (05-14-25 @ 18:47)  HR: 70 (05-15-25 @ 04:10) (70 - 83)  BP: 102/64 (05-15-25 @ 04:10) (102/64 - 147/77)  RR: 18 (05-15-25 @ 04:10) (15 - 19)  SpO2: 97% (05-15-25 @ 04:10) (95% - 100%)  Wt(kg): --    PE  frail  Awake, alert  Anicteric  RRR  CTAB  Abd soft, NT, ND  No c/c  head dressing CDI                            8.3    3.50  )-----------( 107      ( 14 May 2025 14:35 )             25.2       05-14    140  |  103  |  47[H]  ----------------------------<  97  4.3   |  22  |  1.47[H]    Ca    9.6      14 May 2025 14:35  Phos  2.8     05-14  Mg     2.5     05-14    TPro  6.7  /  Alb  3.9  /  TBili  0.2  /  DBili  x   /  AST  15  /  ALT  <5[L]  /  AlkPhos  388[H]  05-14

## 2025-05-15 NOTE — H&P ADULT - PROBLEM SELECTOR PLAN 8
w/ radiographic c/f bony metastasis and bladder mass  - uro and onc c/s in AM   - ALP elevation, repeat in AM and obtain iPTH  - abnormal UA, continued f/u uro and eval of hydro w/ c/f bladder mass above

## 2025-05-15 NOTE — CONSULT NOTE ADULT - SUBJECTIVE AND OBJECTIVE BOX
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 AOx4, reports baseline fatigue, GUTIÉRREZ, chronic cough unchanged x months, in addition to LLE edema x months (reports evaluation outpatient w/ duplex unrevealing), with constipation x4d (passing flatus), when 2d ago while ambulating experienced LLE "giving out" resulting in falls w/o LOC though +head/L elbow trauma, able to crawl to chair and rise w/ ambulation via walker/cane afterward, referred to ED after presentation to scheduled outpatient f/u appt. Not endorsing dizziness, lightheadedness, visual disturbance, numbness (include LE or saddle anesthesia), weakness, (including LEs), tingling, rhinorrhea, odynophagia, CP, palpitations, change in cough/SOB/sputum, abd pain, n/v/d, melena, hematochezia, dysuria, hematuria, change in urinary frequency or odor, urinary/fecal incontinence, or other complaint. Unable to provide full medication list, has few meds in phone photos at times missing frequency/dose. Of note reports fall 1 mo ago for similar reason w/ head trauma, states that he did not report to hospital at that time.     - WBC 3.50, H/H 8.3/25.2 (normocytic), ; DDimer 1040; HST 50 -> 43, proBNP 1828; BUN/SCr 47/1.47 (GFR 46),   - UA protein, nonhemolyzed trace blood (12 RBC); COV/FluAB/RSV not det    CXR:   Clear lungs. Probable bony metastatic disease    CT HEAD:  No acute intracranial hemorrhage, mass effect, or midline shift.    CT CERVICAL SPINE:  No acute fracture or traumatic subluxation.  Multi-level degenerative changes.  Suspected bony metastatic disease.  Known "unspecified bone cancer, "    CTA chest PE protocol IC:   No acute pulmonary embolism.    Right lower lobe 1.3 cm nodular consolidative opacity; this is indeterminate, but may be infectious/inflammatory in etiology. CT chest follow-up in 3 months recommended to ensure clearing.    Severe left-sided hydronephrosis. Right hydronephrosis and/or parapelvic cysts. Recommend dedicated CT abdomen and pelvis to further assess.    Diffuse sclerotic lesions throughout the osseous structures; this may represent metastatic disease.    CT A/P IC (prelim):   1.   Bilateral hydroureteronephrosis left-greater-than-right which appears secondary to a mass at the base of the bladder. This mass baby either bladder or prostate in origin. There is extensive bony metastatic disease.  2.   Abnormal appearance of the rectum which may be secondary to proctitis or tumor. Recommend follow-up.    s/p NS 1L  (15 May 2025 02:43)    PERTINENT PM/SXH:   Hypertension    HLD (hyperlipidemia)    Prostate cancer metastatic to bone    Osteonecrosis of jaw    Anemia    History of adjustment disorder    H/O constipation    Epididymo-orchitis    H/O urinary retention      No significant past surgical history      FAMILY HISTORY:  No pertinent family history in first degree relatives      Family Hx substance abuse [ ]yes [ ]no    ITEMS NOT CHECKED ARE NOT PRESENT    SOCIAL HISTORY:   Significant other/partner[ ]  Children[ ]  Zoroastrianism/Spirituality:  Substance hx:  [ ]   Tobacco hx:  [ ]   Alcohol hx: [ ]   Home Opioid hx:  [ ] I-Stop Reference No:  Living Situation: [ ]Home  [ ]Long term care  [ ]Rehab [ ]Other    ADVANCE DIRECTIVES:    DNR/MOLST  [ ]  Living Will  [ ]   DECISION MAKER(s):  [ ] Health Care Proxy(s)  [ ] Surrogate(s)  [ ] Guardian           Name(s): Phone Number(s):    BASELINE (I)ADL(s) (prior to admission):  [place]: [ ]Total  [ ] Moderate [ ]Dependent    Allergies    No Known Allergies    Intolerances    MEDICATIONS  (STANDING):  azithromycin  IVPB      cefTRIAXone   IVPB      heparin   Injectable 5000 Unit(s) SubCutaneous every 8 hours  metoprolol succinate  milliGRAM(s) Oral daily  polyethylene glycol 3350 17 Gram(s) Oral daily  rosuvastatin 10 milliGRAM(s) Oral at bedtime  senna 2 Tablet(s) Oral at bedtime  tamsulosin 0.4 milliGRAM(s) Oral two times a day    MEDICATIONS  (PRN):  acetaminophen     Tablet .. 650 milliGRAM(s) Oral every 6 hours PRN Temp greater or equal to 38C (100.4F), Mild Pain (1 - 3)    PRESENT SYMPTOMS: [ ]Unable to self-report  [ ] CPOT [ ] PAINADs [ ] RDOS  Source if other than patient:  [ ]Family   [ ]Team     Pain: [ ]yes [ ]no  QOL impact -   Location -                    Aggravating factors -  Quality -  Radiation -  Timing-  Severity (0-10 scale):  Minimal acceptable level (0-10 scale):     CPOT:    https://www.[facility].org/getattachment/[email or web address]-[email or web address]/Critical-Care-Pain-Observation-Tool-(CPOT)    PAIN AD Score:   http://geriatrictoolkit.[facility]/cog/painad.pdf (press ctrl +  left click to view)    Dyspnea:                           [ ]Mild [ ]Moderate [ ]Severe      RDOS:  0 to 2  minimal or no respiratory distress   3  mild distress  4 to 6 moderate distress  >7 severe distress  https://homecareinformation.net/handouts/hen/Respiratory_Distress_Observation_Scale.pdf (Ctrl +  left click to view)     Anxiety:                             [ ]Mild [ ]Moderate [ ]Severe  Fatigue:                             [ ]Mild [ ]Moderate [ ]Severe  Nausea:                             [ ]Mild [ ]Moderate [ ]Severe  Loss of appetite:              [ ]Mild [ ]Moderate [ ]Severe  Constipation:                    [ ]Mild [ ]Moderate [ ]Severe    PCSSQ[Palliative Care Spiritual Screening Question]   Severity (0-10):  Score of 4 or > indicate consideration of Chaplaincy referral.  Chaplaincy Referral: [ ] yes [ ] refused [ ] following [ ] Deferred     Caregiver [place]? : [ ] yes [ ] no [ ] Deferred [ ] Declined             Social work referral [ ] Patient & Family Centered Care Referral [ ]     Anticipatory Grief present?:  [ ] yes [ ] no  [ ] Deferred                  Social work referral [ ] Chaplaincy Referral[ ]      Other Symptoms:  [ ]All other review of systems negative     Palliative Performance Status Version 2:         %    http://npcrc.org/files/news/palliative_performance_scale_ppsv2.pdf  PHYSICAL EXAM:  Vital Signs Last 24 Hrs  T(C): 36.6 (15 May 2025 08:59), Max: 36.6 (14 May 2025 18:47)  T(F): 97.9 (15 May 2025 08:59), Max: 97.9 (15 May 2025 08:59)  HR: 72 (15 May 2025 08:59) (70 - 83)  BP: 94/56 (15 May 2025 08:59) (94/56 - 147/77)  BP(mean): 85 (14 May 2025 15:09) (85 - 85)  RR: 18 (15 May 2025 08:59) (15 - 19)  SpO2: 98% (15 May 2025 08:59) (95% - 100%)    Parameters below as of 15 May 2025 08:59  Patient On (Oxygen Delivery Method): room air     I&O's Summary    14 May 2025 07:01  -  15 May 2025 07:00  --------------------------------------------------------  IN: 350 mL / OUT: 500 mL / NET: -150 mL      GENERAL: [ ]Cachexia    [ ]Alert  [ ]Oriented x   [ ]Lethargic  [ ]Unarousable  [ ]Verbal  [ ]Non-Verbal  Behavioral:   [ ] Anxiety  [ ] Delirium [ ] Agitation [ ] Other  HEENT:  [ ]Normal   [ ]Dry mouth   [ ]ET Tube/Trach  [ ]Oral lesions  PULMONARY:   [ ]Clear [ ]Tachypnea  [ ]Audible excessive secretions   [ ]Rhonchi        [ ]Right [ ]Left [ ]Bilateral  [ ]Crackles        [ ]Right [ ]Left [ ]Bilateral  [ ]Wheezing     [ ]Right [ ]Left [ ]Bilateral  [ ]Diminished breath sounds [ ]right [ ]left [ ]bilateral  CARDIOVASCULAR:    [ ]Regular [ ]Irregular [ ]Tachy  [ ]Mign [ ]Murmur [ ]Other  GASTROINTESTINAL:  [ ]Soft  [ ]Distended   [ ]+BS  [ ]Non tender [ ]Tender  [ ]Other [ ]PEG [ ]OGT/ NGT  Last BM:  GENITOURINARY:  [ ]Normal [ ] Incontinent   [ ]Oliguria/Anuria   [ ][place]eler  MUSCULOSKELETAL:   [ ]Normal   [ ]Weakness  [ ]Bed/Wheelchair bound [ ]Edema  NEUROLOGIC:   [ ]No focal deficits  [ ]Cognitive impairment  [ ]Dysphagia [ ]Dysarthria [ ]Paresis [ ]Other   SKIN:   [ ]Normal  [ ]Rash  [ ]Other  [ ]Pressure ulcer(s)       Present on admission [ ]y [ ]n    CRITICAL CARE:  [ ] Shock Present  [ ]Septic [ ]Cardiogenic [ ]Neurologic [ ]Hypovolemic  [ ]  Vasopressors [ ]  Inotropes   [ ]Respiratory failure present [ ]Mechanical ventilation [ ]Non-invasive ventilatory support [ ]High flow    [ ]Acute  [ ]Chronic [ ]Hypoxic  [ ]Hypercarbic [ ]Other  [ ]Other organ failure     LABS:                        7.1    3.57  )-----------( 86       ( 15 May 2025 07:12 )             22.5   05-15    137  |  107  |  40[H]  ----------------------------<  98  4.3   |  16[L]  |  1.26    Ca    9.0      15 May 2025 07:11  Phos  3.0     05-15  Mg     2.2     05-15    TPro  5.5[L]  /  Alb  2.9[L]  /  TBili  0.2  /  DBili  x   /  AST  20  /  ALT  <5[L]  /  AlkPhos  341[H]  05-15  PT/INR - ( 14 May 2025 14:35 )   PT: 10.0 sec;   INR: 0.88 ratio         PTT - ( 14 May 2025 14:35 )  PTT:31.4 sec    Urinalysis Basic - ( 15 May 2025 07:11 )    Color: x / Appearance: x / SG: x / pH: x  Gluc: 98 mg/dL / Ketone: x  / Bili: x / Urobili: x   Blood: x / Protein: x / Nitrite: x   Leuk Esterase: x / RBC: x / WBC x   Sq Epi: x / Non Sq Epi: x / Bacteria: x      RADIOLOGY & ADDITIONAL STUDIES:    PROTEIN CALORIE MALNUTRITION PRESENT: [ ]mild [ ]moderate [ ]severe [ ]underweight [ ]morbid obesity  https://www.andeal.org/vault/2440/web/files/ONC/Table_Clinical%20Characteristics%20to%20Document%20Malnutrition-White%20JV%20et%20al%202012.pdf    Height (cm): 160 (05-15-25 @ 00:42)  Weight (kg): 66 (05-15-25 @ 00:42)  BMI (kg/m2): 25.8 (05-15-25 @ 00:42)    [ ]PPSV2 < or = to 30% [ ]significant weight loss  [ ]poor nutritional intake  [ ]anasarca[ ]Artificial Nutrition      Other REFERRALS:  [ ]Hospice  [ ]Child Life  [ ]Social Work  [ ]Case management [ ]Holistic Therapy     Goals of Care Document:  Time and date of service: 05-15-25 @ 10:01      HPI:  w/o significant prior info in [place]/HIE to review    85M current smoker (1/2 PPD x 70y),  WTC exposure, HTN, HLD, anemia, osteonecrosis of jaw, adjustment d/o, constipation, epididymo-orchitis, urinary retention, prostate cancer with mets to bone, fall (w/ head trauma), p/w falls. AOx4, reports baseline fatigue, GUTIÉRREZ, chronic cough unchanged x months, in addition to LLE edema x months (reports evaluation outpatient w/ duplex unrevealing), with constipation x4d (passing flatus), when 2d ago while ambulating experienced LLE "giving out" resulting in falls w/o LOC though +head/L elbow trauma, able to crawl to chair and rise w/ ambulation via walker/cane afterward, referred to ED after presentation to scheduled outpatient f/u appt. Not endorsing dizziness, lightheadedness, visual disturbance, numbness (include LE or saddle anesthesia), weakness, (including LEs), tingling, rhinorrhea, odynophagia, CP, palpitations, change in cough/SOB/sputum, abd pain, n/v/d, melena, hematochezia, dysuria, hematuria, change in urinary frequency or odor, urinary/fecal incontinence, or other complaint. Unable to provide full medication list, has few meds in phone photos at times missing frequency/dose. Of note reports fall 1 mo ago for similar reason w/ head trauma, states that he did not report to hospital at that time.     PERTINENT PM/SXH:   Hypertension    HLD (hyperlipidemia)    Prostate cancer metastatic to bone    Osteonecrosis of jaw    Anemia    History of adjustment disorder    H/O constipation    Epididymo-orchitis    H/O urinary retention      No significant past surgical history      FAMILY HISTORY:  No pertinent family history in first degree relatives      Family Hx substance abuse [ ]yes [x ]no    ITEMS NOT CHECKED ARE NOT PRESENT    SOCIAL HISTORY:   Significant other/partner[ ]  Children[ x]  Mormonism/Spirituality:  Substance hx:  [ ]   Tobacco hx:  [ ]   Alcohol hx: [ ]   Home Opioid hx:  [ ] I-Stop Reference No:  Living Situation: [ ]Home  [ ]Long term care  [ ]Rehab [ ]Other    ADVANCE DIRECTIVES:    DNR/MOLST  [ ]  Living Will  [ ]   DECISION MAKER(s):  [ ] Health Care Proxy(s)  [x ] Surrogate(s)  [ ] Guardian           Name(s): Phone Number(s): [place] pts dtr    BASELINE (I)ADL(s) (prior to admission):  [place]: [ ]Total  [x ] Moderate [ ]Dependent    Allergies    No Known Allergies    Intolerances    MEDICATIONS  (STANDING):  azithromycin  IVPB      cefTRIAXone   IVPB      heparin   Injectable 5000 Unit(s) SubCutaneous every 8 hours  metoprolol succinate  milliGRAM(s) Oral daily  polyethylene glycol 3350 17 Gram(s) Oral daily  rosuvastatin 10 milliGRAM(s) Oral at bedtime  senna 2 Tablet(s) Oral at bedtime  tamsulosin 0.4 milliGRAM(s) Oral two times a day    MEDICATIONS  (PRN):  acetaminophen     Tablet .. 650 milliGRAM(s) Oral every 6 hours PRN Temp greater or equal to 38C (100.4F), Mild Pain (1 - 3)    PRESENT SYMPTOMS: [ ]Unable to self-report  [ ] CPOT [ ] PAINADs [ ] RDOS  Source if other than patient:  [ ]Family   [ ]Team     Pain: [x ]yes"only when my arm is touched" [ ]no  QOL impact -   Location -                    Aggravating factors -  Quality -  Radiation -  Timing-  Severity (0-10 scale):  Minimal acceptable level (0-10 scale):     CPOT:    https://www.sccm.org/getattachment/wgb56h13-5o4a-3t5z-4a2e-5091h3866v0s/Critical-Care-Pain-Observation-Tool-(CPOT)    PAIN AD Score:   http://geriatrictoolkit.[place].[facility]/cog/painad.pdf (press ctrl +  left click to view)    Dyspnea:                           [ ]Mild [ ]Moderate [ ]Severe      RDOS:  0 to 2  minimal or no respiratory distress   3  mild distress  4 to 6 moderate distress  >7 severe distress  https://homecareinformation.net/handouts/hen/Respiratory_Distress_Observation_Scale.pdf (Ctrl +  left click to view)     Anxiety:                             [ ]Mild [ ]Moderate [ ]Severe  Fatigue:                             [ x]Mild [ ]Moderate [ ]Severe  Nausea:                             [ ]Mild [ ]Moderate [ ]Severe  Loss of appetite:              [x ]Mild [ ]Moderate [ ]Severe  Constipation:                    [ ]Mild [ ]Moderate [ ]Severe    PCSSQ[Palliative Care Spiritual Screening Question]   Severity (0-10):  Score of 4 or > indicate consideration of Chaplaincy referral.  Chaplaincy Referral: [ ] yes [ ] refused [ ] following [xx ] Deferred     Caregiver [place]? : [ ] yes [ ] no [x ] Deferred [ ] Declined             Social work referral [ ] Patient & Family Centered Care Referral [ ]     Anticipatory Grief present?:  [ ] yes [ ] no  [x ] Deferred                  Social work referral [ ] Chaplaincy Referral[ ]      Other Symptoms:  [x ]All other review of systems negative     Palliative Performance Status Version 2: 30-40        %    http://[facility].org/files/news/palliative_performance_scale_ppsv2.pdf  PHYSICAL EXAM:  Vital Signs Last 24 Hrs  T(C): 36.6 (15 May 2025 08:59), Max: 36.6 (14 May 2025 18:47)  T(F): 97.9 (15 May 2025 08:59), Max: 97.9 (15 May 2025 08:59)  HR: 72 (15 May 2025 08:59) (70 - 83)  BP: 94/56 (15 May 2025 08:59) (94/56 - 147/77)  BP(mean): 85 (14 May 2025 15:09) (85 - 85)  RR: 18 (15 May 2025 08:59) (15 - 19)  SpO2: 98% (15 May 2025 08:59) (95% - 100%)    Parameters below as of 15 May 2025 08:59  Patient On (Oxygen Delivery Method): room air     I&O's Summary    14 May 2025 07:01  -  15 May 2025 07:00  --------------------------------------------------------  IN: 350 mL / OUT: 500 mL / NET: -150 mL      GENERAL: [x ]Cachexia    [x ]Alert  [x ]Oriented x   [ ]Lethargic  [ ]Unarousable  [ x]Verbal  [ ]Non-Verbal  Behavioral:   [ ] Anxiety  [ ] Delirium [ ] Agitation [ ] Other  HEENT:  [ ]Normal   [ x]Dry mouth   [ ]ET Tube/Trach  [ ]Oral lesions  PULMONARY:   [x ]Clear [ ]Tachypnea  [ ]Audible excessive secretions   [ ]Rhonchi        [ ]Right [ ]Left [ ]Bilateral  [ ]Crackles        [ ]Right [ ]Left [ ]Bilateral  [ ]Wheezing     [ ]Right [ ]Left [ ]Bilateral  [x ]Diminished breath sounds [ ]right [ ]left [ ]bilateral  CARDIOVASCULAR:    [x ]Regular [ ]Irregular [ ]Tachy  [ ]Ming [ ]Murmur [ ]Other  GASTROINTESTINAL:  [x ]Soft  [ ]Distended   [x ]+BS  [ ]Non tender [ ]Tender  [ ]Other [ ]PEG [ ]OGT/ NGT  Last BM:  GENITOURINARY: new finding of mass on ct  [ ]Normal [ ] Incontinent   [ ]Oliguria/Anuria   [ ]Wheeler  MUSCULOSKELETAL:   x[ ]Normal   [ ]Weakness  [ ]Bed/Wheelchair bound [ ]Edema  NEUROLOGIC:   [x ]No focal deficits  [ ]Cognitive impairment  [ ]Dysphagia [ ]Dysarthria [ ]Paresis [ ]Other   SKIN:  scalp  [ ]Normal  [ ]Rash  [ ]Other  [ ]Pressure ulcer(s)       Present on admission [ ]y [ ]n    CRITICAL CARE:  [ ] Shock Present  [ ]Septic [ ]Cardiogenic [ ]Neurologic [ ]Hypovolemic  [ ]  Vasopressors [ ]  Inotropes   [ ]Respiratory failure present [ ]Mechanical ventilation [ ]Non-invasive ventilatory support [ ]High flow    [ ]Acute  [ ]Chronic [ ]Hypoxic  [ ]Hypercarbic [ ]Other  [ ]Other organ failure     LABS:                        7.1    3.57  )-----------( 86       ( 15 May 2025 07:12 )             22.5   05-15    137  |  107  |  40[H]  ----------------------------<  98  4.3   |  16[L]  |  1.26    Ca    9.0      15 May 2025 07:11  Phos  3.0     05-15  Mg     2.2     05-15    TPro  5.5[L]  /  Alb  2.9[L]  /  TBili  0.2  /  DBili  x   /  AST  20  /  ALT  <5[L]  /  AlkPhos  341[H]  05-15  PT/INR - ( 14 May 2025 14:35 )   PT: 10.0 sec;   INR: 0.88 ratio         PTT - ( 14 May 2025 14:35 )  PTT:31.4 sec    Urinalysis Basic - ( 15 May 2025 07:11 )    Color: x / Appearance: x / SG: x / pH: x  Gluc: 98 mg/dL / Ketone: x  / Bili: x / Urobili: x   Blood: x / Protein: x / Nitrite: x   Leuk Esterase: x / RBC: x / WBC x   Sq Epi: x / Non Sq Epi: x / Bacteria: x      RADIOLOGY & ADDITIONAL STUDIES:    < from: CT Abdomen and Pelvis w/ IV Cont (05.14.25 @ 22:03) >  ACC: [id number] EXAM:  CT ABDOMEN AND PELVIS IC   ORDERED BY:  ANIKET GONG     PROCEDURE DATE:  05/14/2025          INTERPRETATION:  CLINICAL INFORMATION: Acute renal insufficiency and   bilateral hydronephrosis.    COMPARISON: None.    CONTRAST/COMPLICATIONS:  IV Contrast: Omnipaque 350  90 cc administered   10 cc discarded  Oral Contrast: NONE  .    PROCEDURE:  CT of the Abdomen and Pelvis was performed.  Sagittal and coronal reformats were performed.    FINDINGS:  LOWER CHEST: A 1.3 cm right lower lobe nodular opacity and additional   tree-in-bud opacities.    LIVER: Within normal limits.  BILE DUCTS: Normal caliber.  GALLBLADDER: Contracted with stones.  SPLEEN: Within normal limits.  PANCREAS: Within normal limits.  ADRENALS: Within normal limits.  KIDNEYS/URETERS: Severe left hydronephrosis to the level of the abnormal   prostate gland. Likely multiple bilateral cortical cysts. Delayed   nephrogram on the left with excretion of contrast on the right with mild   right hydronephrosisto the level of the urinary bladder..    BLADDER: Within normal limits.  REPRODUCTIVE ORGANS: Prostate gland is markedly enlarged with ill-defined   soft tissue density extending outside of the capsule to the left. There   is asymmetric extension of soft tissue posteriorly from the prostate   gland to the rectum with asymmetric wall thickening to the right of   rectum (3, 121). Presacral fatty stranding. Seminal vesicles are not   visualized and obscured from prostate mass.    BOWEL: No bowel obstruction. Small hiatal hernia.  PERITONEUM/RETROPERITONEUM: Trace free fluid in the abdomen and pelvis.  VESSELS: Atherosclerotic changes.  LYMPH NODES: Enlarged lymph nodes in the retroperitoneum.  ABDOMINAL WALL: Within normal limits.  BONES: Extensive sclerotic bony metastatic disease.    IMPRESSION:  Findings are most suggestive of prostate cancer with associated severe   left and mild right hydronephrosis. Involvement of the seminal vesicles,   urinary bladder and the adjacent rectum.  Extensive bony metastatic disease.      --- End of Report ---            MAVIS CAI MD; Attending Radiologist  This document has been electronically signed. May 15 2025  1:47PM    < end of copied text >    < from: Xray Elbow AP + Lateral, Left (05.15.25 @ 10:07) >  PROCEDURE DATE:  05/15/2025          INTERPRETATION:  EXAM TYPE: XR ELBOW 2 VIEWS LEFT  EXAM DATE AND TIME: 5/15/2025  Indication: "r/o fx"  COMPARISON: None.    TECHNIQUE:3 views of the left elbow    IMPRESSION: No fracture or dislocation. Alignment is anatomic. Mild   degenerative changes of the elbow with a small joint effusion.    --- End of Report ---            TITO HINOJOSA DO; Attending Radiologist    < end of copied text >      PROTEIN CALORIE MALNUTRITION PRESENT: [ ]mild [ ]moderate [ ]severe [ ]underweight [ ]morbid obesity  https://www.andeal.org/vault/2440/web/files/ONC/Table_Clinical%20Characteristics%20to%20Document%20Malnutrition-White%20JV%20et%20al%202012.pdf    Height (cm): 160 (05-15-25 @ 00:42)  Weight (kg): 66 (05-15-25 @ 00:42)  BMI (kg/m2): 25.8 (05-15-25 @ 00:42)    [ ]PPSV2 < or = to 30% [ ]significant weight loss  [ ]poor nutritional intake  [ ]anasarca[ ]Artificial Nutrition      Other REFERRALS:  [ ]Hospice  [ ]Child Life  [ ]Social Work  [ ]Case management [ ]Holistic Therapy     Goals of Care Document:

## 2025-05-15 NOTE — H&P ADULT - PROBLEM SELECTOR PLAN 12
- HSQ VTE PPx (monitor CBC/VS given hematuria)   - DASH/TLC diet pending RN dysphagia screen  - fall, aspiration precautions  - dispo pending course, PT eval   - DDimer elevated w/o PE, reports LE edema L > R evaluated as outpatient w/ normal duplex. Would d/w PCP/onc in AM re existing outpatient w/u and ctm.  - GOC: FULL CODE pending pall c/s and further discussion between pt and daughter in AM

## 2025-05-15 NOTE — CONSULT NOTE ADULT - SUBJECTIVE AND OBJECTIVE BOX
HPI:  Patient is a 85y Male who presented with 5M current smoker (1/2 PPD x 70y), WTC exposure, HTN, HLD, anemia, osteonecrosis of jaw, adjustment d/o, constipation, epididymo-orchitis, urinary retention, prostate cancer with mets to bone, fall (w/ head trauma), p/w falls found to have b/l hydronephrosis with prostate/ bladder mass, abnormal renal fn w/o baseline to compare, and r/o PNA given RLL 1.3 cm nodular consolidative opacity.    Patient is treated for prostate cancer at [facility], he is on Lupron, Xtandi, and  Pluvicto.    on 4/11 increased to .         PAST MEDICAL & SURGICAL HISTORY:  Hypertension       HLD (hyperlipidemia)      Prostate cancer metastatic to bone      Osteonecrosis of jaw      Anemia      History of adjustment disorder      H/O constipation      Epididymo-orchitis      H/O urinary retention      No significant past surgical history        MEDICATIONS  (STANDING):  azithromycin  IVPB      bicalutamide 50 milliGRAM(s) Oral daily  cefTRIAXone   IVPB      enzalutamide 160 milliGRAM(s) Oral daily  heparin   Injectable 5000 Unit(s) SubCutaneous every 8 hours  megestrol 40 milliGRAM(s) Oral daily  metoprolol succinate  milliGRAM(s) Oral daily  polyethylene glycol 3350 17 Gram(s) Oral daily  QUEtiapine 50 milliGRAM(s) Oral at bedtime  rosuvastatin 10 milliGRAM(s) Oral at bedtime  senna 2 Tablet(s) Oral at bedtime  tamsulosin 0.4 milliGRAM(s) Oral two times a day    MEDICATIONS  (PRN):  acetaminophen     Tablet .. 650 milliGRAM(s) Oral every 6 hours PRN Temp greater or equal to 38C (100.4F), Mild Pain (1 - 3)    FAMILY HISTORY:  No pertinent family history in first degree relatives      Allergies    No Known Allergies    Intolerances      SOCIAL HISTORY:   Tobacco hx:    REVIEW OF SYSTEMS: Pertinent positives and negatives as stated in HPI, otherwise negative    Vital signs  T(C): 36.6, Max: 36.6 (05-14 @ 18:47)  HR: 79  BP: 107/57  SpO2: 98%    Output    UOP    Physical Exam  Gen: NAD  Pulm: No respiratory distress, no subcostal retractions  CV: RRR, no JVD  Abd: Soft, NT, ND  :     LABS:          05-15 @ 07:12    WBC 3.57  / Hct 22.5  / SCr --       05-15 @ 07:11    WBC --    / Hct --    / SCr 1.26     05-15    137  |  107  |  40[H]  ----------------------------<  98  4.3   |  16[L]  |  1.26    Ca    9.0      15 May 2025 07:11  Phos  3.0     05-15  Mg     2.2     05-15    TPro  5.5[L]  /  Alb  2.9[L]  /  TBili  0.2  /  DBili  x   /  AST  20  /  ALT  <5[L]  /  AlkPhos  341[H]  05-15    PT/INR - ( 14 May 2025 14:35 )   PT: 10.0 sec;   INR: 0.88 ratio         PTT - ( 14 May 2025 14:35 )  PTT:31.4 sec  Urinalysis Basic - ( 15 May 2025 07:11 )    Color: x / Appearance: x / SG: x / pH: x  Gluc: 98 mg/dL / Ketone: x  / Bili: x / Urobili: x   Blood: x / Protein: x / Nitrite: x   Leuk Esterase: x / RBC: x / WBC x   Sq Epi: x / Non Sq Epi: x / Bacteria: x        Urine Cx:   Urinalysis with Rflx Culture (05.14.25 @ 15:21)    Urine Appearance: Clear   Color: Yellow   Specific Gravity: 1.017   pH Urine: 7.0   Protein, Urine: 100 mg/dL   Glucose Qualitative, Urine: Negative mg/dL   Ketone , Urine: Negative mg/dL   Blood, Urine: Non Hemolyzed Trace   Bilirubin: Negative   Urobilinogen: 0.2 mg/dL   Leukocyte Esterase Concentration: Negative   Nitrite: Negative      Blood Cx:    RADIOLOGY:    < from: CT Abdomen and Pelvis w/ IV Cont (05.14.25 @ 22:03) >    IMPRESSION:   Findings are most suggestive of prostate cancer with associated severe   left and mild right hydronephrosis. Involvement of the seminal vesicles,   urinary bladder and the adjacent rectum.  Extensive bony metastatic disease.    < end of copied text >       HPI:  Patient is a 85y Male who presented with 5M current smoker (1/2 PPD x 70y), WTC exposure, HTN, HLD, anemia, osteonecrosis of jaw, adjustment d/o, constipation, epididymo-orchitis, urinary retention, prostate cancer with mets to bone, fall (w/ head trauma), p/w falls found to have b/l hydronephrosis with prostate/ bladder mass, abnormal renal fn w/o baseline to compare, and r/o PNA given RLL 1.3 cm nodular consolidative opacity.    Patient is treated for prostate cancer at [facility], he is on Lupron, Xtandi, and  Pluvicto.    on 4/11 increased to .     Patient denies any flank pain fevers, N/V.  States that he is incontinent at baseline.  He follows with urologist Dr. Magallon at [facility] who he sees monthly.  Patient is also aware that he has a nonfunctional/ poorly functional L kidney.      PAST MEDICAL & SURGICAL HISTORY:  Hypertension       HLD (hyperlipidemia)      Prostate cancer metastatic to bone      Osteonecrosis of jaw      Anemia      History of adjustment disorder      H/O constipation      Epididymo-orchitis      H/O urinary retention      No significant past surgical history        MEDICATIONS  (STANDING):  azithromycin  IVPB      bicalutamide 50 milliGRAM(s) Oral daily  cefTRIAXone   IVPB      enzalutamide 160 milliGRAM(s) Oral daily  heparin   Injectable 5000 Unit(s) SubCutaneous every 8 hours  megestrol 40 milliGRAM(s) Oral daily  metoprolol succinate  milliGRAM(s) Oral daily  polyethylene glycol 3350 17 Gram(s) Oral daily  QUEtiapine 50 milliGRAM(s) Oral at bedtime  rosuvastatin 10 milliGRAM(s) Oral at bedtime  senna 2 Tablet(s) Oral at bedtime  tamsulosin 0.4 milliGRAM(s) Oral two times a day    MEDICATIONS  (PRN):  acetaminophen     Tablet .. 650 milliGRAM(s) Oral every 6 hours PRN Temp greater or equal to 38C (100.4F), Mild Pain (1 - 3)    FAMILY HISTORY:  No pertinent family history in first degree relatives      Allergies    No Known Allergies    Intolerances      SOCIAL HISTORY:   Tobacco hx:    REVIEW OF SYSTEMS: Pertinent positives and negatives as stated in HPI, otherwise negative    Vital signs  T(C): 36.6, Max: 36.6 (05-14 @ 18:47)  HR: 79  BP: 107/57  SpO2: 98%    Output    UOP    Physical Exam  Gen: NAD  Pulm: No respiratory distress, no subcostal retractions  CV: RRR, no JVD  Abd: Soft, NT, ND  Back: No flank pain     LABS:          05-15 @ 07:12    WBC 3.57  / Hct 22.5  / SCr --       05-15 @ 07:11    WBC --    / Hct --    / SCr 1.26     05-15    137  |  107  |  40[H]  ----------------------------<  98  4.3   |  16[L]  |  1.26    Ca    9.0      15 May 2025 07:11  Phos  3.0     05-15  Mg     2.2     05-15    TPro  5.5[L]  /  Alb  2.9[L]  /  TBili  0.2  /  DBili  x   /  AST  20  /  ALT  <5[L]  /  AlkPhos  341[H]  05-15    PT/INR - ( 14 May 2025 14:35 )   PT: 10.0 sec;   INR: 0.88 ratio         PTT - ( 14 May 2025 14:35 )  PTT:31.4 sec  Urinalysis Basic - ( 15 May 2025 07:11 )    Color: x / Appearance: x / SG: x / pH: x  Gluc: 98 mg/dL / Ketone: x  / Bili: x / Urobili: x   Blood: x / Protein: x / Nitrite: x   Leuk Esterase: x / RBC: x / WBC x   Sq Epi: x / Non Sq Epi: x / Bacteria: x        Urine Cx:   Urinalysis with Rflx Culture (05.14.25 @ 15:21)    Urine Appearance: Clear   Color: Yellow   Specific Gravity: 1.017   pH Urine: 7.0   Protein, Urine: 100 mg/dL   Glucose Qualitative, Urine: Negative mg/dL   Ketone , Urine: Negative mg/dL   Blood, Urine: Non Hemolyzed Trace   Bilirubin: Negative   Urobilinogen: 0.2 mg/dL   Leukocyte Esterase Concentration: Negative   Nitrite: Negative      Blood Cx:    RADIOLOGY:    < from: CT Abdomen and Pelvis w/ IV Cont (05.14.25 @ 22:03) >    IMPRESSION:   Findings are most suggestive of prostate cancer with associated severe   left and mild right hydronephrosis. Involvement of the seminal vesicles,   urinary bladder and the adjacent rectum.  Extensive bony metastatic disease.    < end of copied text >

## 2025-05-15 NOTE — CONSULT NOTE ADULT - NS ATTEND AMEND GEN_ALL_CORE FT
presenting with falls   unclear etiology   pt states his legs give out.   does have bony mets   recommend PT

## 2025-05-15 NOTE — CONSULT NOTE ADULT - CONVERSATION DETAILS
Met with pt at bedside briefy.  NO family at bedside. Pt reports that he is eating breakfast and then would like to go back to sleep. Pt reports no complaints of pain unless his arm is touched. Can teach back information about fall including scalp and L arm injury. Reviewed HCP and MOLST with pt.  He reports he will think about it.    Returned to bedside no family at bedside. pt sleeping. Palliative care will return to bedside.

## 2025-05-15 NOTE — PHARMACOTHERAPY INTERVENTION NOTE - COMMENTS
Medication Reconciliation completed for patient.  These were confirmed with  patient's pharmacy. Updated medications are reflected in Outpatient Medication Review.     Home Medications:  Albuterol (Eqv-Ventolin HFA) 90 mcg/inh inhalation aerosol: 2 puff(s) inhaled every 6 hours as needed for  shortness of breath and/or wheezing  bicalutamide 50 mg oral tablet: 1 tab(s) orally once a day  Crestor 10 mg oral tablet: 1 tab(s) orally once a day  darbepoetin stephanie 500 mcg/mL injectable solution: 500 microgram(s) subcutaneously every 3 weeks  leuprolide 7.5 mg/month intramuscular kit: 7.5 milligram(s) intramuscularly once a month  megestrol 40 mg oral tablet: 1 tab(s) orally once a day  metFORMIN 500 mg oral tablet: 1 tab(s) orally once a day  predniSONE 10 mg oral tablet: 1 tab(s) orally once a day  Seroquel 25 mg oral tablet: 1-2 tablets every night  tamsulosin 0.4 mg oral capsule: 1 cap(s) orally 2 times a day  Toprol- mg oral tablet, extended release: 1 tab(s) orally once a day  Xtandi 40 mg oral tablet: 4 tab(s) orally once a day      Suzette Doll, PharmD, BCPS  Clinical Pharmacy Specialist  Available on Teams

## 2025-05-15 NOTE — PHYSICAL THERAPY INITIAL EVALUATION ADULT - ADDITIONAL COMMENTS
Pt lives alone in an apartment with no steps to enter and elevator access. Pt was completely independent with all functional mobility and ADLs without assistive device, daughter would come to assist with house keeping from time to time. Pt owns RW and cane

## 2025-05-15 NOTE — H&P ADULT - HISTORY OF PRESENT ILLNESS
w/o significant prior info in [place]/HIE to review    85M former smoker (occ x 5y, quit 11y ago) WTC exposure, HTN, HLD, prostate cancer with mets to bone, fall (w/ head trauma), p/w falls. AOx4, reports baseline fatigue, GUTIÉRREZ, chronic cough unchanged x months, in addition to LLE edema x months (reports evaluation outpatient w/ duplex unrevealing), with constipation x4d (passing flatus), when 2d ago while ambulating experienced LLE "giving out" resulting in falls w/o LOC though +head/L elbow trauma, able to crawl to chair and rise w/ ambulation via walker/cane afterward, referred to ED after presentation to scheduled outpatient f/u appt. Not endorsing dizziness, lightheadedness, visual disturbance, numbness (include LE or saddle anesthesia), weakness, (including LEs), tingling, rhinorrhea, odynophagia, CP, palpitations, change in cough/SOB/sputum, abd pain, n/v/d, melena, hematochezia, dysuria, hematuria, change in urinary frequency or odor, or other complaint. Unable to provide full medication list, has few meds in phone photos at times missing frequency/dose. Of note reports fall 1 mo ago for similar reason w/ head trauma, states that he did not report to hospital at that time.     - WBC 3.50, H/H 8.3/25.2 (normocytic), ; DDimer 1040; HST 50 -> 43, proBNP 1828; BUN/SCr 47/1.47 (GFR 46),   - UA protein, nonhemolyzed trace blood (12 RBC); COV/FluAB/RSV not det    CXR:   Clear lungs. Probable bony metastatic disease    CT HEAD:  No acute intracranial hemorrhage, mass effect, or midline shift.    CT CERVICAL SPINE:  No acute fracture or traumatic subluxation.  Multi-level degenerative changes.  Suspected bony metastatic disease.  Known "unspecified bone cancer, "    CTA chest PE protocol IC:   No acute pulmonary embolism.    Right lower lobe 1.3 cm nodular consolidative opacity; this is indeterminate, but may be infectious/inflammatory in etiology. CT chest follow-up in 3 months recommended to ensure clearing.    Severe left-sided hydronephrosis. Right hydronephrosis and/or parapelvic cysts. Recommend dedicated CT abdomen and pelvis to further assess.    Diffuse sclerotic lesions throughout the osseous structures; this may represent metastatic disease.    CT A/P IC (prelim):   1.   Bilateral hydroureteronephrosis left-greater-than-right which appears secondary to a mass at the base of the bladder. This mass baby either bladder or prostate in origin. There is extensive bony metastatic disease.  2.   Abnormal appearance of the rectum which may be secondary to proctitis or tumor. Recommend follow-up.    s/p NS 1L  w/o significant prior info in [place]/HIE to review    85M current smoker (1/2 PPD x 70y),  WTC exposure, HTN, HLD, prostate cancer with mets to bone, fall (w/ head trauma), p/w falls. AOx4, reports baseline fatigue, GUTIÉRREZ, chronic cough unchanged x months, in addition to LLE edema x months (reports evaluation outpatient w/ duplex unrevealing), with constipation x4d (passing flatus), when 2d ago while ambulating experienced LLE "giving out" resulting in falls w/o LOC though +head/L elbow trauma, able to crawl to chair and rise w/ ambulation via walker/cane afterward, referred to ED after presentation to scheduled outpatient f/u appt. Not endorsing dizziness, lightheadedness, visual disturbance, numbness (include LE or saddle anesthesia), weakness, (including LEs), tingling, rhinorrhea, odynophagia, CP, palpitations, change in cough/SOB/sputum, abd pain, n/v/d, melena, hematochezia, dysuria, hematuria, change in urinary frequency or odor, or other complaint. Unable to provide full medication list, has few meds in phone photos at times missing frequency/dose. Of note reports fall 1 mo ago for similar reason w/ head trauma, states that he did not report to hospital at that time.     - WBC 3.50, H/H 8.3/25.2 (normocytic), ; DDimer 1040; HST 50 -> 43, proBNP 1828; BUN/SCr 47/1.47 (GFR 46),   - UA protein, nonhemolyzed trace blood (12 RBC); COV/FluAB/RSV not det    CXR:   Clear lungs. Probable bony metastatic disease    CT HEAD:  No acute intracranial hemorrhage, mass effect, or midline shift.    CT CERVICAL SPINE:  No acute fracture or traumatic subluxation.  Multi-level degenerative changes.  Suspected bony metastatic disease.  Known "unspecified bone cancer, "    CTA chest PE protocol IC:   No acute pulmonary embolism.    Right lower lobe 1.3 cm nodular consolidative opacity; this is indeterminate, but may be infectious/inflammatory in etiology. CT chest follow-up in 3 months recommended to ensure clearing.    Severe left-sided hydronephrosis. Right hydronephrosis and/or parapelvic cysts. Recommend dedicated CT abdomen and pelvis to further assess.    Diffuse sclerotic lesions throughout the osseous structures; this may represent metastatic disease.    CT A/P IC (prelim):   1.   Bilateral hydroureteronephrosis left-greater-than-right which appears secondary to a mass at the base of the bladder. This mass baby either bladder or prostate in origin. There is extensive bony metastatic disease.  2.   Abnormal appearance of the rectum which may be secondary to proctitis or tumor. Recommend follow-up.    s/p NS 1L  w/o significant prior info in [place]/HIE to review    85M current smoker (1/2 PPD x 70y),  WTC exposure, HTN, HLD, anemia, osteonecrosis of jaw, adjustment d/o, constipation, epididymo-orchitis, urinary retention, prostate cancer with mets to bone, fall (w/ head trauma), p/w falls. AOx4, reports baseline fatigue, GUTIÉRREZ, chronic cough unchanged x months, in addition to LLE edema x months (reports evaluation outpatient w/ duplex unrevealing), with constipation x4d (passing flatus), when 2d ago while ambulating experienced LLE "giving out" resulting in falls w/o LOC though +head/L elbow trauma, able to crawl to chair and rise w/ ambulation via walker/cane afterward, referred to ED after presentation to scheduled outpatient f/u appt. Not endorsing dizziness, lightheadedness, visual disturbance, numbness (include LE or saddle anesthesia), weakness, (including LEs), tingling, rhinorrhea, odynophagia, CP, palpitations, change in cough/SOB/sputum, abd pain, n/v/d, melena, hematochezia, dysuria, hematuria, change in urinary frequency or odor, or other complaint. Unable to provide full medication list, has few meds in phone photos at times missing frequency/dose. Of note reports fall 1 mo ago for similar reason w/ head trauma, states that he did not report to hospital at that time.     - WBC 3.50, H/H 8.3/25.2 (normocytic), ; DDimer 1040; HST 50 -> 43, proBNP 1828; BUN/SCr 47/1.47 (GFR 46),   - UA protein, nonhemolyzed trace blood (12 RBC); COV/FluAB/RSV not det    CXR:   Clear lungs. Probable bony metastatic disease    CT HEAD:  No acute intracranial hemorrhage, mass effect, or midline shift.    CT CERVICAL SPINE:  No acute fracture or traumatic subluxation.  Multi-level degenerative changes.  Suspected bony metastatic disease.  Known "unspecified bone cancer, "    CTA chest PE protocol IC:   No acute pulmonary embolism.    Right lower lobe 1.3 cm nodular consolidative opacity; this is indeterminate, but may be infectious/inflammatory in etiology. CT chest follow-up in 3 months recommended to ensure clearing.    Severe left-sided hydronephrosis. Right hydronephrosis and/or parapelvic cysts. Recommend dedicated CT abdomen and pelvis to further assess.    Diffuse sclerotic lesions throughout the osseous structures; this may represent metastatic disease.    CT A/P IC (prelim):   1.   Bilateral hydroureteronephrosis left-greater-than-right which appears secondary to a mass at the base of the bladder. This mass baby either bladder or prostate in origin. There is extensive bony metastatic disease.  2.   Abnormal appearance of the rectum which may be secondary to proctitis or tumor. Recommend follow-up.    s/p NS 1L  w/o significant prior info in [place]/HIE to review    85M current smoker (1/2 PPD x 70y),  WTC exposure, HTN, HLD, anemia, osteonecrosis of jaw, adjustment d/o, constipation, epididymo-orchitis, urinary retention, prostate cancer with mets to bone, fall (w/ head trauma), p/w falls. AOx4, reports baseline fatigue, GUTIÉRREZ, chronic cough unchanged x months, in addition to LLE edema x months (reports evaluation outpatient w/ duplex unrevealing), with constipation x4d (passing flatus), when 2d ago while ambulating experienced LLE "giving out" resulting in falls w/o LOC though +head/L elbow trauma, able to crawl to chair and rise w/ ambulation via walker/cane afterward, referred to ED after presentation to scheduled outpatient f/u appt. Not endorsing dizziness, lightheadedness, visual disturbance, numbness (include LE or saddle anesthesia), weakness, (including LEs), tingling, rhinorrhea, odynophagia, CP, palpitations, change in cough/SOB/sputum, abd pain, n/v/d, melena, hematochezia, dysuria, hematuria, change in urinary frequency or odor, urinary/fecal incontinence, or other complaint. Unable to provide full medication list, has few meds in phone photos at times missing frequency/dose. Of note reports fall 1 mo ago for similar reason w/ head trauma, states that he did not report to hospital at that time.     - WBC 3.50, H/H 8.3/25.2 (normocytic), ; DDimer 1040; HST 50 -> 43, proBNP 1828; BUN/SCr 47/1.47 (GFR 46),   - UA protein, nonhemolyzed trace blood (12 RBC); COV/FluAB/RSV not det    CXR:   Clear lungs. Probable bony metastatic disease    CT HEAD:  No acute intracranial hemorrhage, mass effect, or midline shift.    CT CERVICAL SPINE:  No acute fracture or traumatic subluxation.  Multi-level degenerative changes.  Suspected bony metastatic disease.  Known "unspecified bone cancer, "    CTA chest PE protocol IC:   No acute pulmonary embolism.    Right lower lobe 1.3 cm nodular consolidative opacity; this is indeterminate, but may be infectious/inflammatory in etiology. CT chest follow-up in 3 months recommended to ensure clearing.    Severe left-sided hydronephrosis. Right hydronephrosis and/or parapelvic cysts. Recommend dedicated CT abdomen and pelvis to further assess.    Diffuse sclerotic lesions throughout the osseous structures; this may represent metastatic disease.    CT A/P IC (prelim):   1.   Bilateral hydroureteronephrosis left-greater-than-right which appears secondary to a mass at the base of the bladder. This mass baby either bladder or prostate in origin. There is extensive bony metastatic disease.  2.   Abnormal appearance of the rectum which may be secondary to proctitis or tumor. Recommend follow-up.    s/p NS 1L

## 2025-05-15 NOTE — H&P ADULT - PROBLEM SELECTOR PLAN 10
- abn appearing rectum on CT may be 2/2 proctitis or tumor, uro and GI c/s in AM, covering w/ abx above ON  - cardiomegaly on CT, collaborate w/ outpatient cards re prior TTE (proBNP elevated as well however confounded by poor renal clearance)

## 2025-05-15 NOTE — CONSULT NOTE ADULT - SUBJECTIVE AND OBJECTIVE BOX
NEPHROLOGY - NSN    Patient seen and examined.    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 AOx4, reports baseline fatigue, GUTIÉRREZ, chronic cough unchanged x months, in addition to LLE edema x months (reports evaluation outpatient w/ duplex unrevealing), with constipation x4d (passing flatus), when 2d ago while ambulating experienced LLE "giving out" resulting in falls w/o LOC though +head/L elbow trauma, able to crawl to chair and rise w/ ambulation via walker/cane afterward, referred to ED after presentation to scheduled outpatient f/u appt. Not endorsing dizziness, lightheadedness, visual disturbance, numbness (include LE or saddle anesthesia), weakness, (including LEs), tingling, rhinorrhea, odynophagia, CP, palpitations, change in cough/SOB/sputum, abd pain, n/v/d, melena, hematochezia, dysuria, hematuria, change in urinary frequency or odor, urinary/fecal incontinence, or other complaint. Unable to provide full medication list, has few meds in phone photos at times missing frequency/dose. Of note reports fall 1 mo ago for similar reason w/ head trauma, states that he did not report to hospital at that time.     - WBC 3.50, H/H 8.3/25.2 (normocytic), ; DDimer 1040; HST 50 -> 43, proBNP 1828; BUN/SCr 47/1.47 (GFR 46),   - UA protein, nonhemolyzed trace blood (12 RBC); COV/FluAB/RSV not det    CXR:   Clear lungs. Probable bony metastatic disease    CT HEAD:  No acute intracranial hemorrhage, mass effect, or midline shift.    CT CERVICAL SPINE:  No acute fracture or traumatic subluxation.  Multi-level degenerative changes.  Suspected bony metastatic disease.  Known "unspecified bone cancer, "    CTA chest PE protocol IC:   No acute pulmonary embolism.    Right lower lobe 1.3 cm nodular consolidative opacity; this is indeterminate, but may be infectious/inflammatory in etiology. CT chest follow-up in 3 months recommended to ensure clearing.    Severe left-sided hydronephrosis. Right hydronephrosis and/or parapelvic cysts. Recommend dedicated CT abdomen and pelvis to further assess.    Diffuse sclerotic lesions throughout the osseous structures; this may represent metastatic disease.    CT A/P IC (prelim):   1.   Bilateral hydroureteronephrosis left-greater-than-right which appears secondary to a mass at the base of the bladder. This mass baby either bladder or prostate in origin. There is extensive bony metastatic disease.  2.   Abnormal appearance of the rectum which may be secondary to proctitis or tumor. Recommend follow-up.    s/p NS 1L  (15 May 2025 02:43)      PAST MEDICAL & SURGICAL HISTORY:  Hypertension      HLD (hyperlipidemia)      Prostate cancer metastatic to bone      Osteonecrosis of jaw      Anemia      History of adjustment disorder      H/O constipation      Epididymo-orchitis      H/O urinary retention      No significant past surgical history          MEDICATIONS  (STANDING):  azithromycin  IVPB      cefTRIAXone   IVPB      heparin   Injectable 5000 Unit(s) SubCutaneous every 8 hours  metoprolol succinate  milliGRAM(s) Oral daily  polyethylene glycol 3350 17 Gram(s) Oral daily  rosuvastatin 10 milliGRAM(s) Oral at bedtime  senna 2 Tablet(s) Oral at bedtime  tamsulosin 0.4 milliGRAM(s) Oral two times a day      Allergies    No Known Allergies    Intolerances        SOCIAL HISTORY:  Denies alcohol abuse, drug abuse or tobacco usage.     FAMILY HISTORY:  No pertinent family history in first degree relatives        VITALS:  T(C): 36.6 (05-15-25 @ 08:59), Max: 36.6 (05-14-25 @ 18:47)  HR: 72 (05-15-25 @ 08:59) (70 - 83)  BP: 94/56 (05-15-25 @ 08:59) (94/56 - 147/77)  RR: 18 (05-15-25 @ 08:59) (15 - 19)  SpO2: 98% (05-15-25 @ 08:59) (95% - 100%)    REVIEW OF SYSTEMS:  Denies any nausea, vomiting, diarrhea, fever or chills. Denies chest pain, SOB, focal weakness, hematuria or dysuria. Good oral intake and denies fatigue or weakness. All other pertinent systems are reviewed and are negative.    PHYSICAL EXAM:  Constitutional: NAD  HEENT: EOMI  Neck:  No JVD, supple   Respiratory: CTA B/L  Cardiovascular: S1 and S2, RRR  Gastrointestinal: + BS, soft, NT, ND  Extremities: No peripheral edema, + peripheral pulses  Neurological: A/O x 3, CN2-12 intact  Psychiatric: Normal mood, normal affect  : No Wheeler  Skin: No rashes, C/D/I  Access: Not applicable    I and O's:    05-14 @ 07:01  -  05-15 @ 07:00  --------------------------------------------------------  IN: 350 mL / OUT: 500 mL / NET: -150 mL      Height (cm): 160 (05-15 @ 00:42)  Weight (kg): 66 (05-15 @ 00:42)  BMI (kg/m2): 25.8 (05-15 @ 00:42)  BSA (m2): 1.69 (05-15 @ 00:42)    LABS:                        7.1    3.57  )-----------( 86       ( 15 May 2025 07:12 )             22.5     05-15    137  |  107  |  40[H]  ----------------------------<  98  4.3   |  16[L]  |  1.26    Ca    9.0      15 May 2025 07:11  Phos  3.0     05-15  Mg     2.2     05-15    TPro  5.5[L]  /  Alb  2.9[L]  /  TBili  0.2  /  DBili  x   /  AST  20  /  ALT  <5[L]  /  AlkPhos  341[H]  05-15      URINE:  Urinalysis Basic - ( 15 May 2025 07:11 )    Color: x / Appearance: x / SG: x / pH: x  Gluc: 98 mg/dL / Ketone: x  / Bili: x / Urobili: x   Blood: x / Protein: x / Nitrite: x   Leuk Esterase: x / RBC: x / WBC x   Sq Epi: x / Non Sq Epi: x / Bacteria: x      Sodium, Random Urine: 88 mmol/L (05-15 @ 07:04)  Creatinine, Random Urine: 24 mg/dL (05-15 @ 07:04)  Protein/Creatinine Ratio Calculation: 1.2 Ratio (05-15 @ 07:04)    RADIOLOGY & ADDITIONAL STUDIES:    < from: CT Abdomen and Pelvis w/ IV Cont (05.14.25 @ 22:03) >    ******PRELIMINARY REPORT******      ******PRELIMINARY REPORT******         ACC: [id number] EXAM:  CT ABDOMEN AND PELVIS IC   ORDERED BY:  ANIKET GONG     PROCEDURE DATE:  05/14/2025    ******PRELIMINARY REPORT******      ******PRELIMINARY REPORT******           INTERPRETATION:  [facility] RADIOLOGIST PRELIMINARY REPORT    PROCEDURE INFORMATION:  Exam: CT Abdomen And Pelvis With Contrast  Exam date and time: 5/14/2025 9:55 PM  Age: 85 years old  Clinical indication: B/l hydronephrosis and amari    TECHNIQUE:  Imaging protocol: Computed tomography of the abdomen and pelvis with   contrast.  Contrast material: IV: OMNIPAQUE 350; Contrast volume: 90 ml; Contrast   route:  IV;    COMPARISON:  CT ANGIO CHEST PULMONARY ARTERY 5/14/2025 7:23 PM    FINDINGS:  Lungs: Mild scarring at the right lung base.  Heart: The heart is large.    Liver: Normal. No mass.  Gallbladder and biliary ducts: Cholelithiasis. No CT evidence of acute  cholecystitis.  Pancreas: Normal. No ductal dilation.  Spleen: Normal. No splenomegaly.  Adrenal glands: Normal. No mass.  Kidneys and ureters: Numerous bilateral benign-appearing renal cysts.   Severe  left hydronephrosis with renal parenchymal thinning. Perinephric fluid.   Severe  dilation of the left ureter. No obstructing stone detected. There is   contrast  in the right renal collecting system. Moderate dilation of the right   collecting  system and the right ureter. The mid to distal right ureter is   decompressed.  Distal ureter is dilated. No stone detected.  Stomach and bowel: Rectal wall thickening. Lobulated appearance of the   rectum.  Appendix: The appendix is not seen.    Intraperitoneal space: Unremarkable. No free air. No significant fluid  collection.  Vasculature: Atherosclerosis.  Lymph nodes: Unremarkable. No enlarged lymph nodes.  Urinary bladder: Bladder is partially filled with contrast. Abnormal   lobulated  masslike appearance of the base of the bladder. This measures up to 8.2   cm TRV  and 3.8 cm AP.  Reproductive: The described bladdermass is confluent with the prostate.  Bones/joints: DJD. Extensive bony metastatic disease. Numerous lytic and  sclerotic lesions seen throughout.  Soft tissues: Unremarkable.    IMPRESSION:  1.   Bilateral hydroureteronephrosis left-greater-than-right which appears  secondary to a mass at the base of the bladder. This mass baby either   bladder  or prostate in origin. There is extensive bony metastatic disease.  2.   Abnormal appearance of the rectum which may be secondary to   proctitis or  tumor. Recommend follow-up.        ******PRELIMINARY REPORT******      ******PRELIMINARY REPORT******         < end of copied text >

## 2025-05-15 NOTE — H&P ADULT - NSICDXPASTMEDICALHX_GEN_ALL_CORE_FT
PAST MEDICAL HISTORY:  Anemia     Epididymo-orchitis     H/O constipation     H/O urinary retention     History of adjustment disorder     HLD (hyperlipidemia)     Hypertension     Osteonecrosis of jaw     Prostate cancer metastatic to bone

## 2025-05-15 NOTE — PHYSICAL THERAPY INITIAL EVALUATION ADULT - PERTINENT HX OF CURRENT PROBLEM, REHAB EVAL
85M current smoker (1/2 PPD x 70y),  WTC exposure, HTN, HLD, anemia, osteonecrosis of jaw, adjustment d/o, constipation, epididymo-orchitis, urinary retention, prostate cancer with mets to bone, fall (w/ head trauma), p/w falls. AOx4, reports baseline fatigue, GUTIÉRREZ, chronic cough unchanged x months, in addition to LLE edema x months (reports evaluation outpatient w/ duplex unrevealing), with constipation x4d (passing flatus), when 2d ago while ambulating experienced LLE "giving out" resulting in falls w/o LOC though +head/L elbow trauma, able to crawl to chair and rise w/ ambulation via walker/cane afterward, referred to ED after presentation to scheduled outpatient f/u appt. Not endorsing dizziness, lightheadedness, visual disturbance, numbness (include LE or saddle anesthesia), weakness, (including LEs), tingling, rhinorrhea, odynophagia, CP, palpitations, change in cough/SOB/sputum, abd pain, n/v/d, melena, hematochezia, dysuria, hematuria, change in urinary frequency or odor, urinary/fecal incontinence, or other complaint. Unable to provide full medication list, has few meds in phone photos at times missing frequency/dose. Of note reports fall 1 mo ago for similar reason w/ head trauma, states that he did not report to hospital at that time. Hospital stay: CTA chest: Right lower lobe 1.3 cm nodular consolidative opacity; this is indeterminate, but may be infectious/inflammatory in etiology. CT chest follow-up in 3 months recommended to ensure clearing. Severe left-sided hydronephrosis. Right hydronephrosis and/or parapelvic cysts. Recommend dedicated CT abdomen and pelvis to further assess.

## 2025-05-15 NOTE — CONSULT NOTE ADULT - TIME BILLING
symptom assessment and management, supportive counseling, coordination of care, discussion and coordination with team.    Tanna Alonzo, ANP-BC  Please contact me via Teams  between 6am-2pm. If not answering, please call the palliative care pager [phone]    After 2pm and on weekends, please see the contact information below:    In the event of newly developing, evolving, or worsening symptoms between 2pm and 5pm please contact the Palliative Medicine via extension [phone] for assistance.  After 5pm please contact team via pager (if the patient is at [facility] #[phone] or if the patient is at [facility] #[phone]) The Geriatric and Palliative Medicine service has coverage 24 hours a day/ 7 days a week to provide medical recommendations regarding symptom management needs via telephone

## 2025-05-16 DIAGNOSIS — Z51.5 ENCOUNTER FOR PALLIATIVE CARE: ICD-10-CM

## 2025-05-16 DIAGNOSIS — R64 CACHEXIA: ICD-10-CM

## 2025-05-16 DIAGNOSIS — Z71.89 OTHER SPECIFIED COUNSELING: ICD-10-CM

## 2025-05-16 LAB
APTT BLD: 30.7 SEC — SIGNIFICANT CHANGE UP (ref 26.1–36.8)
BLD GP AB SCN SERPL QL: NEGATIVE — SIGNIFICANT CHANGE UP
CORTIS AM PEAK SERPL-MCNC: 18.9 UG/DL — HIGH (ref 6–18.4)
HCT VFR BLD CALC: 22.7 % — LOW (ref 39–50)
HCT VFR BLD CALC: 27 % — LOW (ref 39–50)
HGB BLD-MCNC: 7 G/DL — CRITICAL LOW (ref 13–17)
HGB BLD-MCNC: 8.4 G/DL — LOW (ref 13–17)
INR BLD: 0.9 RATIO — SIGNIFICANT CHANGE UP (ref 0.85–1.16)
MCHC RBC-ENTMCNC: 29.3 PG — SIGNIFICANT CHANGE UP (ref 27–34)
MCHC RBC-ENTMCNC: 29.4 PG — SIGNIFICANT CHANGE UP (ref 27–34)
MCHC RBC-ENTMCNC: 30.8 G/DL — LOW (ref 32–36)
MCHC RBC-ENTMCNC: 31.1 G/DL — LOW (ref 32–36)
MCV RBC AUTO: 94.1 FL — SIGNIFICANT CHANGE UP (ref 80–100)
MCV RBC AUTO: 95.4 FL — SIGNIFICANT CHANGE UP (ref 80–100)
NRBC BLD AUTO-RTO: 0 /100 WBCS — SIGNIFICANT CHANGE UP (ref 0–0)
NRBC BLD AUTO-RTO: 0 /100 WBCS — SIGNIFICANT CHANGE UP (ref 0–0)
PLATELET # BLD AUTO: 76 K/UL — LOW (ref 150–400)
PLATELET # BLD AUTO: 80 K/UL — LOW (ref 150–400)
PROTHROM AB SERPL-ACNC: 10.4 SEC — SIGNIFICANT CHANGE UP (ref 9.9–13.4)
RBC # BLD: 2.38 M/UL — LOW (ref 4.2–5.8)
RBC # BLD: 2.87 M/UL — LOW (ref 4.2–5.8)
RBC # FLD: 17.6 % — HIGH (ref 10.3–14.5)
RBC # FLD: 18.5 % — HIGH (ref 10.3–14.5)
RH IG SCN BLD-IMP: POSITIVE — SIGNIFICANT CHANGE UP
UUN UR-MCNC: 416 MG/DL — SIGNIFICANT CHANGE UP
WBC # BLD: 3.09 K/UL — LOW (ref 3.8–10.5)
WBC # BLD: 3.69 K/UL — LOW (ref 3.8–10.5)
WBC # FLD AUTO: 3.09 K/UL — LOW (ref 3.8–10.5)
WBC # FLD AUTO: 3.69 K/UL — LOW (ref 3.8–10.5)

## 2025-05-16 PROCEDURE — 99222 1ST HOSP IP/OBS MODERATE 55: CPT

## 2025-05-16 RX ADMIN — Medication 2 TABLET(S): at 21:27

## 2025-05-16 RX ADMIN — POLYETHYLENE GLYCOL 3350 17 GRAM(S): 17 POWDER, FOR SOLUTION ORAL at 08:50

## 2025-05-16 RX ADMIN — ROSUVASTATIN CALCIUM 10 MILLIGRAM(S): 20 TABLET, FILM COATED ORAL at 21:29

## 2025-05-16 RX ADMIN — HEPARIN SODIUM 5000 UNIT(S): 1000 INJECTION INTRAVENOUS; SUBCUTANEOUS at 05:20

## 2025-05-16 RX ADMIN — TAMSULOSIN HYDROCHLORIDE 0.4 MILLIGRAM(S): 0.4 CAPSULE ORAL at 17:02

## 2025-05-16 RX ADMIN — ENZALUTAMIDE 160 MILLIGRAM(S): 40 CAPSULE ORAL at 09:13

## 2025-05-16 RX ADMIN — CEFTRIAXONE 100 MILLIGRAM(S): 500 INJECTION, POWDER, FOR SOLUTION INTRAMUSCULAR; INTRAVENOUS at 04:33

## 2025-05-16 RX ADMIN — TAMSULOSIN HYDROCHLORIDE 0.4 MILLIGRAM(S): 0.4 CAPSULE ORAL at 05:20

## 2025-05-16 RX ADMIN — METOPROLOL SUCCINATE 100 MILLIGRAM(S): 50 TABLET, EXTENDED RELEASE ORAL at 05:20

## 2025-05-16 RX ADMIN — Medication 40 MILLIGRAM(S): at 08:49

## 2025-05-16 RX ADMIN — HEPARIN SODIUM 5000 UNIT(S): 1000 INJECTION INTRAVENOUS; SUBCUTANEOUS at 13:03

## 2025-05-16 RX ADMIN — Medication 250 MILLIGRAM(S): at 04:34

## 2025-05-16 RX ADMIN — HEPARIN SODIUM 5000 UNIT(S): 1000 INJECTION INTRAVENOUS; SUBCUTANEOUS at 21:27

## 2025-05-16 RX ADMIN — QUETIAPINE FUMARATE 50 MILLIGRAM(S): 25 TABLET ORAL at 21:27

## 2025-05-16 NOTE — PHARMACOTHERAPY INTERVENTION NOTE - COMMENTS
Home Medications:  Albuterol (Eqv-Ventolin HFA) 90 mcg/inh inhalation aerosol: 2 puff(s) inhaled every 6 hours as needed for  shortness of breath and/or wheezing  bicalutamide 50 mg oral tablet: 1 tab(s) orally once a day  Crestor 10 mg oral tablet: 1 tab(s) orally once a day  darbepoetin stephanie 500 mcg/mL injectable solution: 500 microgram(s) subcutaneously every 3 weeks  leuprolide 7.5 mg/month intramuscular kit: 7.5 milligram(s) intramuscularly once a month  megestrol 40 mg oral tablet: 1 tab(s) orally once a day  metFORMIN 500 mg oral tablet: 1 tab(s) orally once a day  predniSONE 10 mg oral tablet: 1 tab(s) orally once a day  Seroquel 25 mg oral tablet: 1-2 tablets every night  tamsulosin 0.4 mg oral capsule: 1 cap(s) orally 2 times a day  Toprol- mg oral tablet, extended release: 1 tab(s) orally once a day  Xtandi 40 mg oral tablet: 4 tab(s) orally once a day    Author was able to speak with patient's daughter Alley ([phone]) regarding patient's medications.  Noted duplicate therapy of bicalutamide and Xtandi.  Patient's daughter did confirm patient is on both medications and was unaware of the duplication.     Of note, patient receives bicalutamide from a private urologist (Eduard Magallon, [phone]) and obtains from [employer] whereas [phone] and at a pharmacy in the city.     Recommend to pick one agent over the other as this is a duplication.  Favor Xtandi as this is the regimen recommended by oncologist/heme.     Suzette Doll, PharmD, BCPS  Clinical Pharmacy Specialist  Available on Teams     Home Medications:  Albuterol (Eqv-Ventolin HFA) 90 mcg/inh inhalation aerosol: 2 puff(s) inhaled every 6 hours as needed for  shortness of breath and/or wheezing  bicalutamide 50 mg oral tablet: 1 tab(s) orally once a day  Crestor 10 mg oral tablet: 1 tab(s) orally once a day  darbepoetin stephanie 500 mcg/mL injectable solution: 500 microgram(s) subcutaneously every 3 weeks  leuprolide 7.5 mg/month intramuscular kit: 7.5 milligram(s) intramuscularly once a month  megestrol 40 mg oral tablet: 1 tab(s) orally once a day  metFORMIN 500 mg oral tablet: 1 tab(s) orally once a day  predniSONE 10 mg oral tablet: 1 tab(s) orally once a day  Seroquel 25 mg oral tablet: 1-2 tablets every night  tamsulosin 0.4 mg oral capsule: 1 cap(s) orally 2 times a day  Toprol- mg oral tablet, extended release: 1 tab(s) orally once a day  Xtandi 40 mg oral tablet: 4 tab(s) orally once a day    Author was able to speak with patient's daughter Alley ([phone]) regarding patient's medications.  Noted duplicate therapy of bicalutamide and Xtandi.  Patient's daughter did confirm patient is on both medications and was unaware of the duplication.     Of note, patient receives bicalutamide from a private urologist (Eduard Magallon, [phone]) and obtains from [employer] whereas patient receives Xtandi from oncologist (Salvatore [facility], [phone]) and at a pharmacy in the city.  Patient recieved both medications recently at the end of April 2025.    Recommend to pick one agent over the other as this is a duplication.  Favor Xtandi as this is the regimen recommended by oncologist/heme.     Suzette Doll, PharmD, [facility]  Clinical Pharmacy Specialist  Available on Teams     Home Medications:  Albuterol (Eqv-Ventolin HFA) 90 mcg/inh inhalation aerosol: 2 puff(s) inhaled every 6 hours as needed for  shortness of breath and/or wheezing  bicalutamide 50 mg oral tablet: 1 tab(s) orally once a day  Crestor 10 mg oral tablet: 1 tab(s) orally once a day  darbepoetin stephanie 500 mcg/mL injectable solution: 500 microgram(s) subcutaneously every 3 weeks  leuprolide 7.5 mg/month intramuscular kit: 7.5 milligram(s) intramuscularly once a month  megestrol 40 mg oral tablet: 1 tab(s) orally once a day  metFORMIN 500 mg oral tablet: 1 tab(s) orally once a day  predniSONE 10 mg oral tablet: 1 tab(s) orally once a day  Seroquel 25 mg oral tablet: 1-2 tablets every night  tamsulosin 0.4 mg oral capsule: 1 cap(s) orally 2 times a day  Toprol- mg oral tablet, extended release: 1 tab(s) orally once a day  Xtandi 40 mg oral tablet: 4 tab(s) orally once a day    Author was able to speak with patient's daughter Alley ([phone]) regarding patient's medications.  Noted duplicate therapy of bicalutamide and Xtandi.  Patient's daughter did confirm patient is on both medications and was unaware of the duplication.     Of note, patient receives bicalutamide from a private urologist (Eduard Magallon, [phone]) and obtains from [employer] whereas patient receives Xtandi from oncologist ([facility], [phone]) and at a pharmacy in the city.  Patient received both medications recently at the end of April 2025.    Recommendation to pick one agent over the other.  Favor Xtandi as this is the regimen by patient's oncologist/heme team.      Suzette Doll, PharmD, [facility]  Clinical Pharmacy Specialist  Available on Teams

## 2025-05-16 NOTE — PROGRESS NOTE ADULT - ASSESSMENT
85M current smoker (1/2 PPD x 70y),  WTC exposure, HTN, HLD, anemia, osteonecrosis of jaw, adjustment d/o, constipation, epididymo-orchitis, urinary retention, prostate cancer with mets to bone, fall (w/ head trauma), p/w falls  reduction of all 3 cell lines   Demetrius in light of severe hydro   Low HCO3 which can be seen in metabolic acidosis   Wide spread metastatic disease     1 Endo- Check random cortisol level (testing) in am to assess for adrenal insufficiency(was on Megace and prednisone)   2 Renal- Renal function did normalize.     One of the options include suarez and renal scan to assess the functionality of the hydro but he has a known hx if hydro and outpt   follow up   (supportive care given advanced comorbidities and near normalization of creatinine)    3 ID- IV abx   4 CVS- The lopressor should not be reducing the BP much         Sayed [facility]   [phone]

## 2025-05-16 NOTE — CONSULT NOTE ADULT - SUBJECTIVE AND OBJECTIVE BOX
05-16-25 @ 14:17    Patient is a 85y old  Male who presents with a chief complaint of Fall, hydronephrosis, abnormal renal fn (16 May 2025 12:05)      HPI:  w/o significant prior info in [place]/HIE to review  85M current smoker (1/2 PPD x 70y),  WTC exposure, HTN, HLD, anemia, osteonecrosis of jaw, adjustment d/o, constipation, epididymo-orchitis, urinary retention, prostate cancer with mets to bone, fall (w/ head trauma), p/w falls. AOx4, reports baseline fatigue, GUTIÉRREZ, chronic cough unchanged x months, in addition to LLE edema x months (reports evaluation outpatient w/ duplex unrevealing), with constipation x4d (passing flatus), when 2d ago while ambulating experienced LLE "giving out" resulting in falls w/o LOC though +head/L elbow trauma, able to crawl to chair and rise w/ ambulation via walker/cane afterward, referred to ED after presentation to scheduled outpatient f/u appt. Not endorsing dizziness, lightheadedness, visual disturbance, numbness (include LE or saddle anesthesia), weakness, (including LEs), tingling, rhinorrhea, odynophagia, CP, palpitations, change in cough/SOB/sputum, abd pain, n/v/d, melena, hematochezia, dysuria, hematuria, change in urinary frequency or odor, urinary/fecal incontinence, or other complaint. Unable to provide full medication list, has few meds in phone photos at times missing frequency/dose. Of note reports fall 1 mo ago for similar reason w/ head trauma, states that he did not report to hospital at that time.   - WBC 3.50, H/H 8.3/25.2 (normocytic), ; DDimer 1040; HST 50 -> 43, proBNP 1828; BUN/SCr 47/1.47 (GFR 46),   - UA protein, nonhemolyzed trace blood (12 RBC); COV/FluAB/RSV not det  CXR:   Clear lungs. Probable bony metastatic disease  CT HEAD:  No acute intracranial hemorrhage, mass effect, or midline shift.  CT CERVICAL SPINE:  No acute fracture or traumatic subluxation.  Multi-level degenerative changes.  Suspected bony metastatic disease.  Known "unspecified bone cancer, "  CTA chest PE protocol IC:   No acute pulmonary embolism.  Right lower lobe 1.3 cm nodular consolidative opacity; this is indeterminate, but may be infectious/inflammatory in etiology. CT chest follow-up in 3 months recommended to ensure clearing.  now pulm called for pulmonary nodule:  pt says when he walks he does feels SOB:  long ti me smoker  smoked for 70 years:  has no cough or phlegm :       ?FOLLOWING PRESENT  [ x] Hx of PE/DVT, [x ] Hx COPD, [x ] Hx of Asthma, [y ] Hx of Hospitalization, [ x]  Hx of BiPAP/CPAP use, [ x] Hx of TRAMAINE    Allergies    No Known Allergies    Intolerances        PAST MEDICAL & SURGICAL HISTORY:  Hypertension      HLD (hyperlipidemia)      Prostate cancer metastatic to bone      Osteonecrosis of jaw      Anemia      History of adjustment disorder      H/O constipation      Epididymo-orchitis      H/O urinary retention      No significant past surgical history          FAMILY HISTORY:  No pertinent family history in first degree relatives        Social History: [ 70 years"  half a pk  ] TOBACCO                  [ x ] ETOH                                 [  x] IVDA/DRUGS    REVIEW OF SYSTEMS      General:	s/p fall    Skin/Breast:x  	  Ophthalmologic:x  	x  ENMT:	    Respiratory and Thorax:  gutiérrez  	  Cardiovascular:	x    Gastrointestinal:	x    Genitourinary:	x    Musculoskeletal:	  LLE edema    Neurological:	x    Psychiatric:	x    Hematology/Lymphatics:	x    Endocrine:	x    Allergic/Immunologic:	x    MEDICATIONS  (STANDING):  azithromycin  IVPB      azithromycin  IVPB 500 milliGRAM(s) IV Intermittent every 24 hours  bicalutamide 50 milliGRAM(s) Oral daily  cefTRIAXone   IVPB      cefTRIAXone   IVPB 1000 milliGRAM(s) IV Intermittent every 24 hours  enzalutamide 160 milliGRAM(s) Oral daily  heparin   Injectable 5000 Unit(s) SubCutaneous every 8 hours  megestrol 40 milliGRAM(s) Oral daily  metoprolol succinate  milliGRAM(s) Oral daily  polyethylene glycol 3350 17 Gram(s) Oral daily  QUEtiapine 50 milliGRAM(s) Oral at bedtime  rosuvastatin 10 milliGRAM(s) Oral at bedtime  senna 2 Tablet(s) Oral at bedtime  tamsulosin 0.4 milliGRAM(s) Oral two times a day    MEDICATIONS  (PRN):  acetaminophen     Tablet .. 650 milliGRAM(s) Oral every 6 hours PRN Temp greater or equal to 38C (100.4F), Mild Pain (1 - 3)       Vital Signs Last 24 Hrs  T(C): 36.5 (16 May 2025 12:38), Max: 37.7 (15 May 2025 21:25)  T(F): 97.7 (16 May 2025 12:38), Max: 99.9 (15 May 2025 21:25)  HR: 80 (16 May 2025 12:38) (73 - 86)  BP: 118/69 (16 May 2025 12:38) (107/57 - 159/79)  BP(mean): --  RR: 18 (16 May 2025 12:38) (18 - 19)  SpO2: 100% (16 May 2025 12:38) (95% - 100%)    Parameters below as of 16 May 2025 12:38  Patient On (Oxygen Delivery Method): room air    Orthostatic VS          I&O's Summary    15 May 2025 07:01  -  16 May 2025 07:00  --------------------------------------------------------  IN: 500 mL / OUT: 750 mL / NET: -250 mL    16 May 2025 07:01  -  16 May 2025 14:17  --------------------------------------------------------  IN: 240 mL / OUT: 400 mL / NET: -160 mL        Physical Exam:   GENERAL: Rt forehead bruise  HEENT: IVONNE/   Atraumatic, Normocephalic  ENMT: No tonsillar erythema, exudates, or enlargement; Moist mucous membranes, Good dentition, No lesions  NECK: Supple, No JVD, Normal thyroid  CHEST/LUNG: Clear to auscultation bilaterally  CVS: Regular rate and rhythm; No murmurs, rubs, or gallops  GI: : Soft, Nontender, Nondistended; Bowel sounds present  NERVOUS SYSTEM:  Alert & Oriented X3  EXTREMITIES: left ankle edema  LYMPH: No lymphadenopathy noted  SKIN: No rashes or lesions  ENDOCRINOLOGY: No Thyromegaly  PSYCH: Appropriate    Labs:  0.5<45<4>>16<<7.375>>0.5<<3><<4><<5<<169>>                            7.0    3.09  )-----------( 80       ( 16 May 2025 07:00 )             22.7                         7.1    3.57  )-----------( 86       ( 15 May 2025 07:12 )             22.5                         8.3    3.50  )-----------( 107      ( 14 May 2025 14:35 )             25.2     05-15    137  |  107  |  40[H]  ----------------------------<  98  4.3   |  16[L]  |  1.26  05-14    140  |  103  |  47[H]  ----------------------------<  97  4.3   |  22  |  1.47[H]    Ca    9.0      15 May 2025 07:11  Ca    9.6      14 May 2025 14:35  Phos  3.0     05-15  Phos  2.8     05-14  Mg     2.2     05-15  Mg     2.5     05-14    TPro  5.5[L]  /  Alb  2.9[L]  /  TBili  0.2  /  DBili  x   /  AST  20  /  ALT  <5[L]  /  AlkPhos  341[H]  05-15  TPro  6.7  /  Alb  3.9  /  TBili  0.2  /  DBili  x   /  AST  15  /  ALT  <5[L]  /  AlkPhos  388[H]  05-14    CAPILLARY BLOOD GLUCOSE        LIVER FUNCTIONS - ( 15 May 2025 07:11 )  Alb: 2.9 g/dL / Pro: 5.5 g/dL / ALK PHOS: 341 U/L / ALT: <5 U/L / AST: 20 U/L / GGT: x           PT/INR - ( 16 May 2025 06:58 )   PT: 10.4 sec;   INR: 0.90 ratio         PTT - ( 16 May 2025 06:58 )  PTT:30.7 sec  Urinalysis Basic - ( 15 May 2025 07:11 )    Color: x / Appearance: x / SG: x / pH: x  Gluc: 98 mg/dL / Ketone: x  / Bili: x / Urobili: x   Blood: x / Protein: x / Nitrite: x   Leuk Esterase: x / RBC: x / WBC x   Sq Epi: x / Non Sq Epi: x / Bacteria: x      Urinalysis with Rflx Culture (collected 14 May 2025 15:21)    Culture - Blood (collected 14 May 2025 14:35)  Source: Blood Blood  Preliminary Report (15 May 2025 20:02):    No growth at 24 hours    Culture - Blood (collected 14 May 2025 14:35)  Source: Blood Blood  Preliminary Report (15 May 2025 20:02):    No growth at 24 hours      D DImer  Procalcitonin: 0.08 ng/mL (05-15 @ 07:11)  D-Dimer Assay, Quantitative: 1040 ng/mL DDU (05-14 @ 14:35)      Studies  Chest X-RAY  CT SCAN Chest   CT Abdomen  Venous Dopplers: LE:   Others        rad< from: CT Abdomen and Pelvis w/ IV Cont (05.14.25 @ 22:03) >  Findings are most suggestive of prostate cancer with associated severe   left and mild right hydronephrosis. Involvement of the seminal vesicles,   urinary bladder and the adjacent rectum.  Extensive bony metastatic disease.    < end of copied text >  < from: CT Angio Chest PE Protocol w/ IV Cont (05.14.25 @ 19:29) >  No acute pulmonary embolism.    Right lower lobe 1.3 cm nodular consolidative opacity; this is   indeterminate, but may be infectious/inflammatory in etiology. CT chest   follow-up in 3 months recommended to ensure clearing.    Severe left-sided hydronephrosis. Right hydronephrosis and/or parapelvic   cysts. Recommend dedicated CT abdomen and pelvis to further assess.    Diffuse sclerotic lesions throughout the osseous structures; this may   represent metastatic disease.    < end of copied text >

## 2025-05-16 NOTE — CONSULT NOTE ADULT - ASSESSMENT
85M current smoker (1/2 PPD x 70y),  WTC exposure, HTN, HLD, anemia, osteonecrosis of jaw, adjustment d/o, constipation, epididymo-orchitis, urinary retention, prostate cancer with mets to bone, fall (w/ head trauma), p/w falls. AOx4, reports baseline fatigue, GUTIÉRREZ, chronic cough unchanged x months, in addition to LLE edema x months (reports evaluation outpatient w/ duplex unrevealing), with constipation x4d (passing flatus), when 2d ago while ambulating experienced LLE "giving out" resulting in falls w/o LOC though +head/L elbow trauma, able to crawl to chair and rise w/ ambulation via walker/cane afterward, referred to ED after presentation to scheduled outpatient f/u appt. Not endorsing dizziness, lightheadedness, visual disturbance, numbness (include LE or saddle anesthesia), weakness, (including LEs), tingling, rhinorrhea, odynophagia, CP, palpitations, change in cough/SOB/sputum, abd pain, n/v/d, melena, hematochezia, dysuria, hematuria, change in urinary frequency or odor, urinary/fecal incontinence, or other complaint. Unable to provide full medication list, has few meds in phone photos at times missing frequency/dose. Of note reports fall 1 mo ago for similar reason w/ head trauma, states that he did not report to hospital at that time.   - WBC 3.50, H/H 8.3/25.2 (normocytic), ; DDimer 1040; HST 50 -> 43, proBNP 1828; BUN/SCr 47/1.47 (GFR 46),     Urinary tract obstruction.   Bilateral hydroureteronephrosis on ct abd   cont antibiotics   getting ceftriaxone   Strept / legionella is negative   on room air   VBG seems reasonable:     Abnormal renal function.   creatinine improved  could be dehydration  cont to monitor     Opacity of lung on imaging study.   CT chest showed: No acute pulmonary embolism. Right lower lobe 1.3 cm nodular consolidative opacity; this is indeterminate, but may be infectious/inflammatory in etiology. CT chest follow-up in 3 months recommended to ensure clearing. Severe left-sided hydronephrosis. Right hydronephrosis and/or parapelvic cysts.  Diffuse sclerotic lesions throughout the osseous structures; this may represent metastatic disease.  his likelihood of  malignancy is high given his extensive smoking history :  he is already on antibiotics at this time:  need short terma follow up with repeat ct  scan with pet scan in 6 weeks times  he has been strongly advised to stop smoking     Pancytopenia.   continue to monitor   heme follow up appreciated.  his hb is low today  : getting Blood transfusion     S/P Fall.   CTH/C-spine w/o acute finding  fall precautions.    hypertension.   Toprol.    HLD (hyperlipidemia).    crestor    Prostate cancer.   no active intervention at this time  palliative care evaluation pending  1 unit PRBC for severe anemia.  HE HAS EXTENSIVE METASTATIC DISEASE BONY :  ON CT SCAN    CT bd showed: Findings are most suggestive of prostate cancer with associated severe left and mild right hydronephrosis. Involvement of the seminal vesicles, urinary bladder and the adjacent rectum.Extensive bony metastatic disease.      Palliative care to see     dw acp ;  need short terma follow up with repeat ct  scan with pet scan in 6 weeks times Detail Level: Simple

## 2025-05-16 NOTE — PROGRESS NOTE ADULT - NS ATTEND AMEND GEN_ALL_CORE FT
Agree with above assessment and plan.     85M current smoker (1/2 PPD x 70y),  WTC exposure, HTN, HLD, anemia, osteonecrosis of jaw, adjustment d/o, constipation, epididymo-orchitis, urinary retention, prostate cancer with mets to bone, fall (w/ head trauma), p/w falls. UTI/PNA on abx. Anemia in the setting of treatment and malignancy, transfuse PRN and on aranesp as above. Was on Pluvicto with increasing PSA level. Follow up at [facility] after discharge. No plan for treatment of prostate cancer inpatient.

## 2025-05-16 NOTE — CONSULT NOTE ADULT - REASON FOR ADMISSION
Fall, hydronephrosis, abnormal renal fn

## 2025-05-16 NOTE — PROGRESS NOTE ADULT - ASSESSMENT
85M current smoker (1/2 PPD x 70y),  WTC exposure, HTN, HLD, anemia, osteonecrosis of jaw, adjustment d/o, constipation, epididymo-orchitis, urinary retention, prostate cancer with mets to bone, fall (w/ head trauma), p/w falls    Stage IVB prostate cancer  --[facility]  --on Lupron + Xtandi (160mg QD) + Pluvicto  --Recently completed three doses of Pluvicto, tolerating well. 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   --ongoing care after discharge    Anemia   --2/2 malignancy and treatment  --baseline 7.8-11.2  --no nutritional deficiencies noted on outpatient lab work from 5/14  --s/p Aranesp 500 mcg on 5/14    #PNA  #Colitis  --on IV abx, management per ID and primary    will follow,    Kristal Alvarez NP  Hematology/ Oncology  [facility]  [phone] (office)  [phone] (alt office)  Evenings and weekends please call MD on call or office

## 2025-05-16 NOTE — PROGRESS NOTE ADULT - PROBLEM SELECTOR PLAN 8
no active intervention at this time  palliative care evaluation pending no active intervention at this time  palliative care evaluation pending  1 unit PRBC for severe anemia

## 2025-05-17 LAB
ANION GAP SERPL CALC-SCNC: 15 MMOL/L — SIGNIFICANT CHANGE UP (ref 5–17)
BUN SERPL-MCNC: 35 MG/DL — HIGH (ref 7–23)
CALCIUM SERPL-MCNC: 8.9 MG/DL — SIGNIFICANT CHANGE UP (ref 8.4–10.5)
CHLORIDE SERPL-SCNC: 106 MMOL/L — SIGNIFICANT CHANGE UP (ref 96–108)
CO2 SERPL-SCNC: 16 MMOL/L — LOW (ref 22–31)
CREAT SERPL-MCNC: 1.55 MG/DL — HIGH (ref 0.5–1.3)
DAT POLY-SP REAG RBC QL: NEGATIVE — SIGNIFICANT CHANGE UP
EGFR: 44 ML/MIN/1.73M2 — LOW
EGFR: 44 ML/MIN/1.73M2 — LOW
GLUCOSE SERPL-MCNC: 115 MG/DL — HIGH (ref 70–99)
HCT VFR BLD CALC: 26.1 % — LOW (ref 39–50)
HGB BLD-MCNC: 8.4 G/DL — LOW (ref 13–17)
MCHC RBC-ENTMCNC: 30.2 PG — SIGNIFICANT CHANGE UP (ref 27–34)
MCHC RBC-ENTMCNC: 32.2 G/DL — SIGNIFICANT CHANGE UP (ref 32–36)
MCV RBC AUTO: 93.9 FL — SIGNIFICANT CHANGE UP (ref 80–100)
NRBC BLD AUTO-RTO: 0 /100 WBCS — SIGNIFICANT CHANGE UP (ref 0–0)
PLATELET # BLD AUTO: 73 K/UL — LOW (ref 150–400)
POTASSIUM SERPL-MCNC: 4.1 MMOL/L — SIGNIFICANT CHANGE UP (ref 3.5–5.3)
POTASSIUM SERPL-SCNC: 4.1 MMOL/L — SIGNIFICANT CHANGE UP (ref 3.5–5.3)
RBC # BLD: 2.78 M/UL — LOW (ref 4.2–5.8)
RBC # FLD: 18 % — HIGH (ref 10.3–14.5)
SODIUM SERPL-SCNC: 137 MMOL/L — SIGNIFICANT CHANGE UP (ref 135–145)
WBC # BLD: 3.47 K/UL — LOW (ref 3.8–10.5)
WBC # FLD AUTO: 3.47 K/UL — LOW (ref 3.8–10.5)

## 2025-05-17 RX ORDER — AZITHROMYCIN 250 MG
500 CAPSULE ORAL DAILY
Refills: 0 | Status: DISCONTINUED | OUTPATIENT
Start: 2025-05-17 | End: 2025-05-18

## 2025-05-17 RX ADMIN — HEPARIN SODIUM 5000 UNIT(S): 1000 INJECTION INTRAVENOUS; SUBCUTANEOUS at 05:23

## 2025-05-17 RX ADMIN — METOPROLOL SUCCINATE 100 MILLIGRAM(S): 50 TABLET, EXTENDED RELEASE ORAL at 05:23

## 2025-05-17 RX ADMIN — HEPARIN SODIUM 5000 UNIT(S): 1000 INJECTION INTRAVENOUS; SUBCUTANEOUS at 21:35

## 2025-05-17 RX ADMIN — HEPARIN SODIUM 5000 UNIT(S): 1000 INJECTION INTRAVENOUS; SUBCUTANEOUS at 13:08

## 2025-05-17 RX ADMIN — TAMSULOSIN HYDROCHLORIDE 0.4 MILLIGRAM(S): 0.4 CAPSULE ORAL at 17:07

## 2025-05-17 RX ADMIN — TAMSULOSIN HYDROCHLORIDE 0.4 MILLIGRAM(S): 0.4 CAPSULE ORAL at 05:23

## 2025-05-17 RX ADMIN — QUETIAPINE FUMARATE 50 MILLIGRAM(S): 25 TABLET ORAL at 21:35

## 2025-05-17 RX ADMIN — ENZALUTAMIDE 160 MILLIGRAM(S): 40 CAPSULE ORAL at 11:23

## 2025-05-17 RX ADMIN — Medication 40 MILLIGRAM(S): at 11:02

## 2025-05-17 RX ADMIN — POLYETHYLENE GLYCOL 3350 17 GRAM(S): 17 POWDER, FOR SOLUTION ORAL at 11:02

## 2025-05-17 RX ADMIN — ROSUVASTATIN CALCIUM 10 MILLIGRAM(S): 20 TABLET, FILM COATED ORAL at 21:36

## 2025-05-17 RX ADMIN — Medication 250 MILLIGRAM(S): at 04:56

## 2025-05-17 RX ADMIN — CEFTRIAXONE 100 MILLIGRAM(S): 500 INJECTION, POWDER, FOR SOLUTION INTRAMUSCULAR; INTRAVENOUS at 04:05

## 2025-05-17 NOTE — PROGRESS NOTE ADULT - PROBLEM SELECTOR PLAN 8
no active intervention at this time  palliative care evaluation noted  awaiting follow up   s/p 1 unit PRBC for severe anemia  discontinue bicalutamide  patient on two agents  continue Xtandi

## 2025-05-18 LAB
ANION GAP SERPL CALC-SCNC: 13 MMOL/L — SIGNIFICANT CHANGE UP (ref 5–17)
BUN SERPL-MCNC: 30 MG/DL — HIGH (ref 7–23)
CALCIUM SERPL-MCNC: 8.8 MG/DL — SIGNIFICANT CHANGE UP (ref 8.4–10.5)
CHLORIDE SERPL-SCNC: 108 MMOL/L — SIGNIFICANT CHANGE UP (ref 96–108)
CO2 SERPL-SCNC: 17 MMOL/L — LOW (ref 22–31)
CREAT SERPL-MCNC: 1.41 MG/DL — HIGH (ref 0.5–1.3)
EGFR: 49 ML/MIN/1.73M2 — LOW
EGFR: 49 ML/MIN/1.73M2 — LOW
GLUCOSE SERPL-MCNC: 96 MG/DL — SIGNIFICANT CHANGE UP (ref 70–99)
POTASSIUM SERPL-MCNC: 4.2 MMOL/L — SIGNIFICANT CHANGE UP (ref 3.5–5.3)
POTASSIUM SERPL-SCNC: 4.2 MMOL/L — SIGNIFICANT CHANGE UP (ref 3.5–5.3)
SODIUM SERPL-SCNC: 138 MMOL/L — SIGNIFICANT CHANGE UP (ref 135–145)

## 2025-05-18 RX ADMIN — HEPARIN SODIUM 5000 UNIT(S): 1000 INJECTION INTRAVENOUS; SUBCUTANEOUS at 14:01

## 2025-05-18 RX ADMIN — HEPARIN SODIUM 5000 UNIT(S): 1000 INJECTION INTRAVENOUS; SUBCUTANEOUS at 05:39

## 2025-05-18 RX ADMIN — QUETIAPINE FUMARATE 50 MILLIGRAM(S): 25 TABLET ORAL at 21:29

## 2025-05-18 RX ADMIN — TAMSULOSIN HYDROCHLORIDE 0.4 MILLIGRAM(S): 0.4 CAPSULE ORAL at 17:31

## 2025-05-18 RX ADMIN — METOPROLOL SUCCINATE 100 MILLIGRAM(S): 50 TABLET, EXTENDED RELEASE ORAL at 05:39

## 2025-05-18 RX ADMIN — TAMSULOSIN HYDROCHLORIDE 0.4 MILLIGRAM(S): 0.4 CAPSULE ORAL at 05:39

## 2025-05-18 RX ADMIN — CEFTRIAXONE 100 MILLIGRAM(S): 500 INJECTION, POWDER, FOR SOLUTION INTRAMUSCULAR; INTRAVENOUS at 04:30

## 2025-05-18 RX ADMIN — HEPARIN SODIUM 5000 UNIT(S): 1000 INJECTION INTRAVENOUS; SUBCUTANEOUS at 21:29

## 2025-05-18 RX ADMIN — ROSUVASTATIN CALCIUM 10 MILLIGRAM(S): 20 TABLET, FILM COATED ORAL at 21:29

## 2025-05-18 RX ADMIN — ENZALUTAMIDE 160 MILLIGRAM(S): 40 CAPSULE ORAL at 13:40

## 2025-05-18 RX ADMIN — Medication 40 MILLIGRAM(S): at 13:33

## 2025-05-18 NOTE — PROGRESS NOTE ADULT - PROBLEM SELECTOR PLAN 8
no active intervention at this time  palliative care evaluation noted  discontinue bicalutamide  patient on two agents  continue Xtandi

## 2025-05-18 NOTE — PROGRESS NOTE ADULT - ASSESSMENT
85M current smoker (1/2 PPD x 70y),  WTC exposure, HTN, HLD, anemia, osteonecrosis of jaw, adjustment d/o, constipation, epididymo-orchitis, urinary retention, prostate cancer with mets to bone, fall (w/ head trauma), p/w falls  reduction of all 3 cell lines   Demetrius in light of severe hydro   Low HCO3 which can be seen in metabolic acidosis   Wide spread metastatic disease     Plan:    1 Endo- Random cortisol level18.9(was on Megace and prednisone)   2 Renal- Renal function stable    known hx if hydro and outpt   follow up   (supportive care given advanced comorbidities and near normalization of creatinine)   3 ID- C/w IV abx   4 CVS- C/w Toprol  mg daily  5 Prostate ca- mgmt per heme/ onc, Palliative eval      Palliative eval pending for [facility]    Lesley Gonzalez NP  [facility]   [phone]

## 2025-05-18 NOTE — PROGRESS NOTE ADULT - ASSESSMENT
85M current smoker (1/2 PPD x 70y),  WTC exposure, HTN, HLD, anemia, osteonecrosis of jaw, adjustment d/o, constipation, epididymo-orchitis, urinary retention, prostate cancer with mets to bone, fall (w/ head trauma), p/w falls. AOx4, reports baseline fatigue, GUTIÉRREZ, chronic cough unchanged x months, in addition to LLE edema x months (reports evaluation outpatient w/ duplex unrevealing), with constipation x4d (passing flatus), when 2d ago while ambulating experienced LLE "giving out" resulting in falls w/o LOC though +head/L elbow trauma, able to crawl to chair and rise w/ ambulation via walker/cane afterward, referred to ED after presentation to scheduled outpatient f/u appt. Not endorsing dizziness, lightheadedness, visual disturbance, numbness (include LE or saddle anesthesia), weakness, (including LEs), tingling, rhinorrhea, odynophagia, CP, palpitations, change in cough/SOB/sputum, abd pain, n/v/d, melena, hematochezia, dysuria, hematuria, change in urinary frequency or odor, urinary/fecal incontinence, or other complaint. Unable to provide full medication list, has few meds in phone photos at times missing frequency/dose. Of note reports fall 1 mo ago for similar reason w/ head trauma, states that he did not report to hospital at that time.   - WBC 3.50, H/H 8.3/25.2 (normocytic), ; DDimer 1040; HST 50 -> 43, proBNP 1828; BUN/SCr 47/1.47 (GFR 46),     Urinary tract obstruction.   Bilateral hydroureteronephrosis on ct abd   cont antibiotics   getting ceftriaxone   Strept / legionella is negative   on room air   VBG seems reasonable:     5/18: defer to primary team     Abnormal renal function.   creatinine improved  could be dehydration  cont to monitor     5/28: renal functions still abnormal  :       Opacity of lung on imaging study.   CT chest showed: No acute pulmonary embolism. Right lower lobe 1.3 cm nodular consolidative opacity; this is indeterminate, but may be infectious/inflammatory in etiology. CT chest follow-up in 3 months recommended to ensure clearing. Severe left-sided hydronephrosis. Right hydronephrosis and/or parapelvic cysts.  Diffuse sclerotic lesions throughout the osseous structures; this may represent metastatic disease.  his likelihood of  malignancy is high given his extensive smoking history :  he is already on antibiotics at this time:  need short terma follow up with repeat ct  scan with pet scan in 6 weeks times  he has been strongly advised to stop smoking     5/18: as above:   he is on room air at this time    Pancytopenia.   continue to monitor   heme follow up appreciated.  his hb is low today  : getting Blood transfusion     5/18: hb is OK;  but his plts counts cont to drop :  cont to monitor:     S/P Fall.   CTH/C-spine w/o acute finding  fall precautions.    hypertension.   Toprol.    HLD (hyperlipidemia).    crestor    Prostate cancer.   no active intervention at this time  palliative care evaluation pending  1 unit PRBC for severe anemia.  HE HAS EXTENSIVE METASTATIC DISEASE BONY :  ON CT SCAN    CT bd showed: Findings are most suggestive of prostate cancer with associated severe left and mild right hydronephrosis. Involvement of the seminal vesicles, urinary bladder and the adjacent rectum.Extensive bony metastatic disease.        need short terma follow up with repeat ct  scan with pet scan in 6 weeks times

## 2025-05-19 ENCOUNTER — TRANSCRIPTION ENCOUNTER (OUTPATIENT)
Age: 85
End: 2025-05-19

## 2025-05-19 ENCOUNTER — NON-APPOINTMENT (OUTPATIENT)
Age: 85
End: 2025-05-19

## 2025-05-19 VITALS
OXYGEN SATURATION: 99 % | DIASTOLIC BLOOD PRESSURE: 60 MMHG | RESPIRATION RATE: 18 BRPM | SYSTOLIC BLOOD PRESSURE: 100 MMHG | HEART RATE: 90 BPM

## 2025-05-19 LAB
CULTURE RESULTS: SIGNIFICANT CHANGE UP
CULTURE RESULTS: SIGNIFICANT CHANGE UP
SPECIMEN SOURCE: SIGNIFICANT CHANGE UP
SPECIMEN SOURCE: SIGNIFICANT CHANGE UP

## 2025-05-19 PROCEDURE — 71046 X-RAY EXAM CHEST 2 VIEWS: CPT

## 2025-05-19 PROCEDURE — 85379 FIBRIN DEGRADATION QUANT: CPT

## 2025-05-19 PROCEDURE — 82947 ASSAY GLUCOSE BLOOD QUANT: CPT

## 2025-05-19 PROCEDURE — 86880 COOMBS TEST DIRECT: CPT

## 2025-05-19 PROCEDURE — 83540 ASSAY OF IRON: CPT

## 2025-05-19 PROCEDURE — 87040 BLOOD CULTURE FOR BACTERIA: CPT

## 2025-05-19 PROCEDURE — 85730 THROMBOPLASTIN TIME PARTIAL: CPT

## 2025-05-19 PROCEDURE — 84540 ASSAY OF URINE/UREA-N: CPT

## 2025-05-19 PROCEDURE — 84100 ASSAY OF PHOSPHORUS: CPT

## 2025-05-19 PROCEDURE — 86901 BLOOD TYPING SEROLOGIC RH(D): CPT

## 2025-05-19 PROCEDURE — 83970 ASSAY OF PARATHORMONE: CPT

## 2025-05-19 PROCEDURE — 82803 BLOOD GASES ANY COMBINATION: CPT

## 2025-05-19 PROCEDURE — 84132 ASSAY OF SERUM POTASSIUM: CPT

## 2025-05-19 PROCEDURE — 86923 COMPATIBILITY TEST ELECTRIC: CPT

## 2025-05-19 PROCEDURE — 83735 ASSAY OF MAGNESIUM: CPT

## 2025-05-19 PROCEDURE — 80053 COMPREHEN METABOLIC PANEL: CPT

## 2025-05-19 PROCEDURE — 99285 EMERGENCY DEPT VISIT HI MDM: CPT

## 2025-05-19 PROCEDURE — 87637 SARSCOV2&INF A&B&RSV AMP PRB: CPT

## 2025-05-19 PROCEDURE — 87899 AGENT NOS ASSAY W/OPTIC: CPT

## 2025-05-19 PROCEDURE — 81001 URINALYSIS AUTO W/SCOPE: CPT

## 2025-05-19 PROCEDURE — 83880 ASSAY OF NATRIURETIC PEPTIDE: CPT

## 2025-05-19 PROCEDURE — 85014 HEMATOCRIT: CPT

## 2025-05-19 PROCEDURE — 83036 HEMOGLOBIN GLYCOSYLATED A1C: CPT

## 2025-05-19 PROCEDURE — 93005 ELECTROCARDIOGRAM TRACING: CPT

## 2025-05-19 PROCEDURE — 82330 ASSAY OF CALCIUM: CPT

## 2025-05-19 PROCEDURE — 82728 ASSAY OF FERRITIN: CPT

## 2025-05-19 PROCEDURE — 87640 STAPH A DNA AMP PROBE: CPT

## 2025-05-19 PROCEDURE — P9016: CPT

## 2025-05-19 PROCEDURE — 82310 ASSAY OF CALCIUM: CPT

## 2025-05-19 PROCEDURE — 36415 COLL VENOUS BLD VENIPUNCTURE: CPT

## 2025-05-19 PROCEDURE — 0241U: CPT

## 2025-05-19 PROCEDURE — 85025 COMPLETE CBC W/AUTO DIFF WBC: CPT

## 2025-05-19 PROCEDURE — 97116 GAIT TRAINING THERAPY: CPT

## 2025-05-19 PROCEDURE — 86900 BLOOD TYPING SEROLOGIC ABO: CPT

## 2025-05-19 PROCEDURE — 86850 RBC ANTIBODY SCREEN: CPT

## 2025-05-19 PROCEDURE — 70450 CT HEAD/BRAIN W/O DYE: CPT

## 2025-05-19 PROCEDURE — 85045 AUTOMATED RETICULOCYTE COUNT: CPT

## 2025-05-19 PROCEDURE — 82570 ASSAY OF URINE CREATININE: CPT

## 2025-05-19 PROCEDURE — 85027 COMPLETE CBC AUTOMATED: CPT

## 2025-05-19 PROCEDURE — 84145 PROCALCITONIN (PCT): CPT

## 2025-05-19 PROCEDURE — 99497 ADVNCD CARE PLAN 30 MIN: CPT

## 2025-05-19 PROCEDURE — 97530 THERAPEUTIC ACTIVITIES: CPT

## 2025-05-19 PROCEDURE — 85610 PROTHROMBIN TIME: CPT

## 2025-05-19 PROCEDURE — 72125 CT NECK SPINE W/O DYE: CPT

## 2025-05-19 PROCEDURE — 82435 ASSAY OF BLOOD CHLORIDE: CPT

## 2025-05-19 PROCEDURE — 85018 HEMOGLOBIN: CPT

## 2025-05-19 PROCEDURE — 82533 TOTAL CORTISOL: CPT

## 2025-05-19 PROCEDURE — 36430 TRANSFUSION BLD/BLD COMPNT: CPT

## 2025-05-19 PROCEDURE — 84295 ASSAY OF SERUM SODIUM: CPT

## 2025-05-19 PROCEDURE — 99233 SBSQ HOSP IP/OBS HIGH 50: CPT

## 2025-05-19 PROCEDURE — 87641 MR-STAPH DNA AMP PROBE: CPT

## 2025-05-19 PROCEDURE — 84156 ASSAY OF PROTEIN URINE: CPT

## 2025-05-19 PROCEDURE — 71275 CT ANGIOGRAPHY CHEST: CPT

## 2025-05-19 PROCEDURE — 84484 ASSAY OF TROPONIN QUANT: CPT

## 2025-05-19 PROCEDURE — 74177 CT ABD & PELVIS W/CONTRAST: CPT

## 2025-05-19 PROCEDURE — 82746 ASSAY OF FOLIC ACID SERUM: CPT

## 2025-05-19 PROCEDURE — 83605 ASSAY OF LACTIC ACID: CPT

## 2025-05-19 PROCEDURE — 82607 VITAMIN B-12: CPT

## 2025-05-19 PROCEDURE — 73070 X-RAY EXAM OF ELBOW: CPT

## 2025-05-19 PROCEDURE — 83550 IRON BINDING TEST: CPT

## 2025-05-19 PROCEDURE — 97161 PT EVAL LOW COMPLEX 20 MIN: CPT

## 2025-05-19 PROCEDURE — 80048 BASIC METABOLIC PNL TOTAL CA: CPT

## 2025-05-19 PROCEDURE — 84300 ASSAY OF URINE SODIUM: CPT

## 2025-05-19 RX ORDER — BICALUTAMIDE 50 MG/1
1 TABLET, FILM COATED ORAL
Refills: 0 | DISCHARGE

## 2025-05-19 RX ORDER — PREDNISONE 20 MG/1
1 TABLET ORAL
Refills: 0 | DISCHARGE

## 2025-05-19 RX ORDER — POLYETHYLENE GLYCOL 3350 17 G/17G
17 POWDER, FOR SOLUTION ORAL
Qty: 0 | Refills: 0 | DISCHARGE
Start: 2025-05-19

## 2025-05-19 RX ORDER — QUETIAPINE FUMARATE 25 MG/1
1 TABLET ORAL
Qty: 0 | Refills: 0 | DISCHARGE

## 2025-05-19 RX ORDER — BICALUTAMIDE 50 MG/1
50 TABLET, FILM COATED ORAL DAILY
Refills: 0 | Status: DISCONTINUED | OUTPATIENT
Start: 2025-05-19 | End: 2025-05-19

## 2025-05-19 RX ORDER — SENNA 187 MG
2 TABLET ORAL
Qty: 0 | Refills: 0 | DISCHARGE
Start: 2025-05-19

## 2025-05-19 RX ADMIN — CEFTRIAXONE 100 MILLIGRAM(S): 500 INJECTION, POWDER, FOR SOLUTION INTRAMUSCULAR; INTRAVENOUS at 04:02

## 2025-05-19 RX ADMIN — METOPROLOL SUCCINATE 100 MILLIGRAM(S): 50 TABLET, EXTENDED RELEASE ORAL at 05:42

## 2025-05-19 RX ADMIN — TAMSULOSIN HYDROCHLORIDE 0.4 MILLIGRAM(S): 0.4 CAPSULE ORAL at 05:44

## 2025-05-19 RX ADMIN — Medication 40 MILLIGRAM(S): at 13:30

## 2025-05-19 RX ADMIN — HEPARIN SODIUM 5000 UNIT(S): 1000 INJECTION INTRAVENOUS; SUBCUTANEOUS at 13:31

## 2025-05-19 RX ADMIN — BICALUTAMIDE 50 MILLIGRAM(S): 50 TABLET, FILM COATED ORAL at 13:35

## 2025-05-19 RX ADMIN — HEPARIN SODIUM 5000 UNIT(S): 1000 INJECTION INTRAVENOUS; SUBCUTANEOUS at 05:45

## 2025-05-19 NOTE — PROGRESS NOTE ADULT - ASSESSMENT
85M current smoker (1/2 PPD x 70y),  WTC exposure, HTN, HLD, anemia, osteonecrosis of jaw, adjustment d/o, constipation, epididymo-orchitis, urinary retention, prostate cancer with mets to bone, fall (w/ head trauma), p/w falls. AOx4, reports baseline fatigue, GUTIÉRREZ, chronic cough unchanged x months, in addition to LLE edema x months (reports evaluation outpatient w/ duplex unrevealing), with constipation x4d (passing flatus), when 2d ago while ambulating experienced LLE "giving out" resulting in falls w/o LOC though +head/L elbow trauma, able to crawl to chair and rise w/ ambulation via walker/cane afterward, referred to ED after presentation to scheduled outpatient f/u appt. Not endorsing dizziness, lightheadedness, visual disturbance, numbness (include LE or saddle anesthesia), weakness, (including LEs), tingling, rhinorrhea, odynophagia, CP, palpitations, change in cough/SOB/sputum, abd pain, n/v/d, melena, hematochezia, dysuria, hematuria, change in urinary frequency or odor, urinary/fecal incontinence, or other complaint. Unable to provide full medication list, has few meds in phone photos at times missing frequency/dose. Of note reports fall 1 mo ago for similar reason w/ head trauma, states that he did not report to hospital at that time.   - WBC 3.50, H/H 8.3/25.2 (normocytic), ; DDimer 1040; HST 50 -> 43, proBNP 1828; BUN/SCr 47/1.47 (GFR 46),     Urinary tract obstruction.   Bilateral hydroureteronephrosis on ct abd   cont antibiotics   getting ceftriaxone   Strept / legionella is negative   on room air   VBG seems reasonable:     5/18: defer to primary team   5/19: finishing antibiotics  for possible dc today     Abnormal renal function.   creatinine improved  could be dehydration  cont to monitor     5/28: renal functions still abnormal  :   5/19: na and l is OK:  creat slightly downtrended       Opacity of lung on imaging study.   CT chest showed: No acute pulmonary embolism. Right lower lobe 1.3 cm nodular consolidative opacity; this is indeterminate, but may be infectious/inflammatory in etiology. CT chest follow-up in 3 months recommended to ensure clearing. Severe left-sided hydronephrosis. Right hydronephrosis and/or parapelvic cysts.  Diffuse sclerotic lesions throughout the osseous structures; this may represent metastatic disease.  his likelihood of  malignancy is high given his extensive smoking history :  he is already on antibiotics at this time:  need short terma follow up with repeat ct  scan with pet scan in 6 weeks times  he has been strongly advised to stop smoking     5/18: as above:   he is on room air at this time    5/19: dw ap :  to document in dc summary:  he would need repeat ct scan with pet scan in 6 weeks time after dc:     Pancytopenia.   continue to monitor   heme follow up appreciated.  his hb is low today  : getting Blood transfusion     5/18: hb is OK;  but his plts counts cont to drop :  cont to monitor:     5/19: seems stable:    S/P Fall.   CTH/C-spine w/o acute finding  fall precautions.    hypertension.   Toprol.    HLD (hyperlipidemia).    crestor    Prostate cancer.   no active intervention at this time  palliative care evaluation pending  1 unit PRBC for severe anemia.  HE HAS EXTENSIVE METASTATIC DISEASE BONY :  ON CT SCAN    CT bd showed: Findings are most suggestive of prostate cancer with associated severe left and mild right hydronephrosis. Involvement of the seminal vesicles, urinary bladder and the adjacent rectum.Extensive bony metastatic disease.        need short terma follow up with repeat ct  scan with pet scan in 6 weeks times    dw acp

## 2025-05-19 NOTE — CHART NOTE - NSCHARTNOTEFT_GEN_A_CORE
Request from Dr. Fitzpatrick to facilitate patient discharge. Medication reconciliation reviewed, revised, and resolved with Dr. Fitzpatrick who had medically cleared patient for discharge with follow-up as advised. Please refer to discharge note for detailed hospital course. Patient is currently stable for discharge to home at this time.

## 2025-05-19 NOTE — PROGRESS NOTE ADULT - PROBLEM SELECTOR PROBLEM 1
Urinary tract obstruction
Prostate cancer
Urinary tract obstruction
Urinary tract obstruction

## 2025-05-19 NOTE — PROGRESS NOTE ADULT - PROBLEM SELECTOR PLAN 2
2/2 to chronic disease  c/w po intake.
creatinine up today 1.5  will continue to monitor   renal follow up
creatinine stable ~ 1.4   will continue to monitor   renal follow up
creatinine improved  will continue to monitor
creatinine stable ~ 1.4   will continue to monitor   renal follow up
creatinine improved  will continue to monitor

## 2025-05-19 NOTE — DISCHARGE NOTE PROVIDER - HOSPITAL COURSE
HPI:  85M current smoker (1/2 PPD x 70y),  WTC exposure, HTN, HLD, anemia, osteonecrosis of jaw, adjustment d/o, constipation, epididymo-orchitis, urinary retention, prostate cancer with mets to bone, fall (w/ head trauma), p/w falls. AOx4, reports baseline fatigue, GUTIÉRREZ, chronic cough unchanged x months, in addition to LLE edema x months (reports evaluation outpatient w/ duplex unrevealing), with constipation x4d (passing flatus), when 2d ago while ambulating experienced LLE "giving out" resulting in falls w/o LOC though +head/L elbow trauma, able to crawl to chair and rise w/ ambulation via walker/cane afterward, referred to ED after presentation to scheduled outpatient f/u appt. Not endorsing dizziness, lightheadedness, visual disturbance, numbness (include LE or saddle anesthesia), weakness, (including LEs), tingling, rhinorrhea, odynophagia, CP, palpitations, change in cough/SOB/sputum, abd pain, n/v/d, melena, hematochezia, dysuria, hematuria, change in urinary frequency or odor, urinary/fecal incontinence, or other complaint. Unable to provide full medication list, has few meds in phone photos at times missing frequency/dose. Of note reports fall 1 mo ago for similar reason w/ head trauma, states that he did not report to hospital at that time.     - WBC 3.50, H/H 8.3/25.2 (normocytic), ; DDimer 1040; HST 50 -> 43, proBNP 1828; BUN/SCr 47/1.47 (GFR 46),   - UA protein, nonhemolyzed trace blood (12 RBC); COV/FluAB/RSV not det    CXR:   Clear lungs. Probable bony metastatic disease    CT HEAD:  No acute intracranial hemorrhage, mass effect, or midline shift.    CT CERVICAL SPINE:  No acute fracture or traumatic subluxation.  Multi-level degenerative changes.  Suspected bony metastatic disease.  Known "unspecified bone cancer, "    CTA chest PE protocol IC:   No acute pulmonary embolism.    Right lower lobe 1.3 cm nodular consolidative opacity; this is indeterminate, but may be infectious/inflammatory in etiology. CT chest follow-up in 3 months recommended to ensure clearing.    Severe left-sided hydronephrosis. Right hydronephrosis and/or parapelvic cysts. Recommend dedicated CT abdomen and pelvis to further assess.    Diffuse sclerotic lesions throughout the osseous structures; this may represent metastatic disease.    CT A/P IC (prelim):   1.   Bilateral hydroureteronephrosis left-greater-than-right which appears secondary to a mass at the base of the bladder. This mass baby either bladder or prostate in origin. There is extensive bony metastatic disease.  2.   Abnormal appearance of the rectum which may be secondary to proctitis or tumor. Recommend follow-up.    s/p NS 1L  (15 May 2025 02:43)    Hospital Course: Patient was admitted with urinary tract obstruction, abnormal renal function, opacity of lung on imaging study, pancytopenia, a recent fall, prostate cancer, and constipation. Patient’s urinary tract obstruction requires no inpatient intervention at this time, and creatinine is downtrending. Patient’s creatinine is stable around 1.4 mg/dL and should continue to be monitored with outpatient providers - has a  private urologist, Dr. Magallon. Pulmonary was consulted for the opacity on imaging, thought to represent pneumonia. Patient completed a course of IV ceftriaxone and azithromycin. For pancytopenia, hematology follow-up was appreciated. Patient received one unit of PRBCs on 5/16. Patient has stage IVB prostate cancer and follows with Dr. Galvan. Patient is on leuprolide (Lupron), enzalutamide (Xtandi) 160mg daily, and Pluvicto. Per Dr. Galvan, Xtandi should be resumed. Physical therapy evaluated the patient recommended home PT. Regarding patient’s prostate cancer, no active intervention is planned at this time. Palliative care performed an evaluation. Bicalutamide was discontinued; patient is currently on two other agents for prostate cancer and will continue Xtandi. For constipation, patient to continue Miralax and senna.      Discharge/Dispo/Med rec discussed with attending Dr. Fitzpatrick. Patient medically cleared for discharge with outpatient follow up        Important Medication Changes and Reason:  - follow up with PCP regarding restarting prednisone as outpatient    Active or Pending Issues Requiring Follow-up:  - need short term follow up with repeat CT scan with PET scan in 6 weeks from discharge  - follow up with private urologist, Dr. Magallon  - follow up with oncology Dr. Galvan    Advanced Directives:   [X] Full code  [ ] DNR  [ ] Hospice    Discharge Diagnoses:

## 2025-05-19 NOTE — PROGRESS NOTE ADULT - PROBLEM SELECTOR PLAN 7
- c/w home crestor (verify med in AM)
continue Crestor
- c/w home crestor (verify med in AM)

## 2025-05-19 NOTE — PROGRESS NOTE ADULT - PROBLEM SELECTOR PLAN 1
Bilateral hydroureteronephrosis   oncology consultation appreciated  urology consultation   renal consultation appreciated
Bilateral hydroureteronephrosis   urology consultation appreciated   renal consultation appreciated
metastatic disease  c/w full supportive care  management as per onc
chronic  no inpatient intervention at this time   continue to monitor creatinine

## 2025-05-19 NOTE — DISCHARGE NOTE NURSING/CASE MANAGEMENT/SOCIAL WORK - NSSCTYPOFSERV_GEN_ALL_CORE
Home Physical Therapy [facility]

## 2025-05-19 NOTE — PROGRESS NOTE ADULT - ASSESSMENT
85M current smoker (1/2 PPD x 70y),  WTC exposure, HTN, HLD, anemia, osteonecrosis of jaw, adjustment d/o, constipation, epididymo-orchitis, urinary retention, prostate cancer with mets to bone, fall (w/ head trauma), p/w falls.    Stage IVB prostate cancer  --follows w/ Dr. Galvan of [facility]  --on Lupron + Xtandi (160mg QD) + Pluvicto  --Recently completed three doses of Pluvicto, tolerating well. 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   --Plan to resume bicalutamide and hold Xtandi (due to recent falls)  --ongoing care after discharge    Anemia   --2/2 malignancy and treatment  --baseline 7.8-11.2  --no nutritional deficiencies noted on outpatient lab work from 5/14  --s/p Aranesp 500 mcg on 5/14    #PNA  #Colitis  --on IV abx, management per ID and primary    will follow, discussed with Dr. Fitzpatrick.     Rodrigue Sanabria PA-C  Hematology/Oncology  [facility]  [phone] (office)  Evenings and weekends please call MD on call or office 85M current smoker (1/2 PPD x 70y),  WTC exposure, HTN, HLD, anemia, osteonecrosis of jaw, adjustment d/o, constipation, epididymo-orchitis, urinary retention, prostate cancer with mets to bone, fall (w/ head trauma), p/w falls.    Stage IVB prostate cancer  --follows w/ Dr. Galvan of [facility]  --on Lupron + Xtandi (160mg QD) + Pluvicto  --Recently completed three doses of Pluvicto, tolerating well. 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   --Per Dr. Galvan, please resume bicalutamide and continue Xtandi   --ongoing care after discharge    Anemia   --2/2 malignancy and treatment  --baseline 7.8-11.2  --no nutritional deficiencies noted on outpatient lab work from 5/14  --s/p Aranesp 500 mcg on 5/14    #PNA  #Colitis  --on IV abx, management per ID and primary    will follow,    Rodrigue Sanabria PA-C  Hematology/Oncology  [facility]  [phone] (office)  Evenings and weekends please call MD on call or office 85M current smoker (1/2 PPD x 70y),  WTC exposure, HTN, HLD, anemia, osteonecrosis of jaw, adjustment d/o, constipation, epididymo-orchitis, urinary retention, prostate cancer with mets to bone, fall (w/ head trauma), p/w falls.    Stage IVB prostate cancer  --follows w/ Dr. Galvan of [facility]  --on Lupron + Xtandi (160mg QD) + Pluvicto  --Recently completed three doses of Pluvicto, tolerating well. 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   --Per Dr. Galvan, please resume Xtandi   --ongoing care after discharge    Anemia   --2/2 malignancy and treatment  --baseline 7.8-11.2  --no nutritional deficiencies noted on outpatient lab work from 5/14  --s/p Aranesp 500 mcg on 5/14    #PNA  #Colitis  --on IV abx, management per ID and primary    will follow,    Rodrigue Sanabria PA-C  Hematology/Oncology  [facility]  [phone] (office)  Evenings and weekends please call MD on call or office

## 2025-05-19 NOTE — DISCHARGE NOTE PROVIDER - CARE PROVIDERS DIRECT ADDRESSES
,DirectAddress_Unknown,DirectAddress_Unknown,DirectAddress_Unknown,[email or web address]

## 2025-05-19 NOTE — PROGRESS NOTE ADULT - PROBLEM SELECTOR PLAN 3
appreciated pulmonary consult  likely pneumonia  continue antibiotics day 4 of ceftriaxone IV   change azithromycin to po
appreciated pulmonary consult  completed antibiotics day 5 of ceftriaxone IV for pneumonia
pt with multiple falls  c/w safety precaution
awaiting pulmonary consult  likely pneumonia  continue antibiotics day 2 of ceftriaxone IV azithromycin
will have pulmonary consult  likely pneumonia
appreciated pulmonary consult  likely pneumonia  continue antibiotics day 3 of ceftriaxone IV   change azithromycin to po

## 2025-05-19 NOTE — PROGRESS NOTE ADULT - CONVERSATION DETAILS
Met with pt at bedside for greater then 16 mins discussing ACP and GOC. Pt has a good understanding of medical conditions and hospital course.  Discussed MOLST form and HCP. Pt reports that he would not want to be placed on life support or have DNR completed.  MOLST completed pt now DNR/DNI, no PEG.  Plan is for home later today. Pt reports that his dtr Alley is his HCP and he has completed in the past. Goals are established.  Palliative care will sign off.  Please reconsult if necessary.

## 2025-05-19 NOTE — PROGRESS NOTE ADULT - NSPROGADDITIONALINFOA_GEN_ALL_CORE
discussed with covering ACP  encounter 50 min including PE, progress note, medication adjustment and d/w ACP and patient
stable for discharge with home PT

## 2025-05-19 NOTE — DISCHARGE NOTE PROVIDER - NSDCFUADDAPPT_GEN_ALL_CORE_FT
APPTS ARE READY TO BE MADE: [X] YES    Best Family or Patient Contact (if needed):    Additional Information about above appointments (if needed):    1:   2:   3:     Other comments or requests:    APPTS ARE READY TO BE MADE: [X] YES    Best Family or Patient Contact (if needed):    Additional Information about above appointments (if needed):    1:   2:   3:     Other comments or requests:   Patient informed us they already have secured a follow up appointment which is not visible on [facility]. Patient advised they scheduled an appointment with Dr. Braxton Magallon on 6/9     Provided patient with provider referral information, however patient prefers to schedule the appointments on their own. Patients daughter asked for the referrals to be emailed along with the definitions for each specialties and the reason that patient was referred to each provider. Email sent.

## 2025-05-19 NOTE — PROGRESS NOTE ADULT - PROBLEM SELECTOR PROBLEM 2
Abnormal renal function
Abnormal renal function
Cachexia
Abnormal renal function

## 2025-05-19 NOTE — PROGRESS NOTE ADULT - TIME BILLING
symptom assessment and management, supportive counseling, coordination of care, discussion and coordination with team.    Tanna Alonzo, ANP-BC  Please contact me via Teams  between 6am-2pm. If not answering, please call the palliative care pager [phone]    After 2pm and on weekends, please see the contact information below:    In the event of newly developing, evolving, or worsening symptoms between 2pm and 5pm please contact the Palliative Medicine via extension [phone] for assistance.  After 5pm please contact team via pager (if the patient is at [facility] #[phone] or if the patient is at [facility] #[phone]) The Geriatric and Palliative Medicine service has coverage 24 hours a day/ 7 days a week to provide medical recommendations regarding symptom management needs via telephone

## 2025-05-19 NOTE — PROGRESS NOTE ADULT - PROVIDER SPECIALTY LIST ADULT
Heme/Onc
Nephrology
Nephrology
Pulmonology
Pulmonology
Heme/Onc
Internal Medicine
Nephrology
Palliative Care
Internal Medicine

## 2025-05-19 NOTE — DISCHARGE NOTE PROVIDER - NSDCCPCAREPLAN_GEN_ALL_CORE_FT
PRINCIPAL DISCHARGE DIAGNOSIS  Diagnosis: NHAN (acute kidney injury)  Assessment and Plan of Treatment: Monitoring: Monitor your urine output. Report any decrease in urination, difficulty urinating, or pain with urination to your physician immediately.  Hydration: Drink plenty of fluids, especially water, to help support your kidney function and prevent dehydration.  Follow-up: It's crucial to follow up with both your nephrologist and your urologist, Dr. Magallon, as directed.      SECONDARY DISCHARGE DIAGNOSES  Diagnosis: Opacity of lung on imaging study  Assessment and Plan of Treatment: Medications: You completed a course of antibiotics  - follow up with PCP regarding restarting prednisone as outpatient  Monitoring: You will need short term follow up with repeat CT scan with PET scan in 6 weeks from discharge  Watch for any recurrence of respiratory symptoms, such as fever, cough, shortness of breath, or chest pain. Report any of these to your physician promptly.    Diagnosis: Pancytopenia  Assessment and Plan of Treatment: Monitoring: Monitor for signs of anemia (fatigue, shortness of breath, pallor), bleeding (easy bruising, bleeding gums), or infection (fever, chills). Report any of these to your physician.  Follow-up: Follow up with your hematologist as instructed for further evaluation and management of your pancytopenia.    Diagnosis: Prostate cancer  Assessment and Plan of Treatment: Medications: Resume taking enzalutamide (Xtandi) as directed by Dr. Galvan.   Follow-up: Maintain regular follow-up appointments with Dr. Galvan to monitor your prostate cancer and discuss any concerns.  Palliative Care: Continue to follow up with palliative care as needed for symptom management and support.    Diagnosis: Constipation  Assessment and Plan of Treatment: Medications: Continue Miralax and senna as directed.  Diet: Increase your intake of fiber-rich foods (fruits, vegetables, whole grains) and fluids to help prevent constipation.  Activity: Try to stay as active as possible within your limitations, as physical activity can help improve bowel regularity.

## 2025-05-19 NOTE — DISCHARGE NOTE PROVIDER - NSDCMRMEDTOKEN_GEN_ALL_CORE_FT
Albuterol (Eqv-Ventolin HFA) 90 mcg/inh inhalation aerosol: 2 puff(s) inhaled every 6 hours as needed for  shortness of breath and/or wheezing  Crestor 10 mg oral tablet: 1 tab(s) orally once a day  darbepoetin stephanie 500 mcg/mL injectable solution: 500 microgram(s) subcutaneously every 3 weeks  leuprolide 7.5 mg/month intramuscular kit: 7.5 milligram(s) intramuscularly once a month  megestrol 40 mg oral tablet: 1 tab(s) orally once a day  metFORMIN 500 mg oral tablet: 1 tab(s) orally once a day  polyethylene glycol 3350 oral powder for reconstitution: 17 gram(s) orally once a day Hold if experiencing diarrhea  senna leaf extract oral tablet: 2 tab(s) orally once a day (at bedtime) Hold if experiencing diarrhea  Seroquel 25 mg oral tablet: 1 tab(s) orally once a day (at bedtime) 1-2 tablets every night  tamsulosin 0.4 mg oral capsule: 1 cap(s) orally 2 times a day  Toprol- mg oral tablet, extended release: 1 tab(s) orally once a day  Xtandi 40 mg oral tablet: 4 tab(s) orally once a day

## 2025-05-19 NOTE — PROGRESS NOTE ADULT - PROBLEM SELECTOR PLAN 9
- chronic per notation, will start miralax and senna, monitor BM
continue miralax and senna, monitor BM
- chronic per notation, will start miralax and senna, monitor BM
continue Miralax and senna, monitor BM
continue miralax and senna, monitor BM

## 2025-05-19 NOTE — DISCHARGE NOTE PROVIDER - CONDITIONS AT DISCHARGE
Danielle from [facility] Danielle at [phone]   per attending Dr. Fitzpatrick

## 2025-05-19 NOTE — PROGRESS NOTE ADULT - PROBLEM SELECTOR PLAN 5
see goc note
CTH/C-spine w/o acute finding  fall precautions
CTH/C-spine w/o acute finding  fall precautions
fall precautions
CTH/C-spine w/o acute finding  fall precautions
fall precautions

## 2025-05-19 NOTE — PROGRESS NOTE ADULT - PROBLEM SELECTOR PLAN 6
continue home Toprol

## 2025-05-19 NOTE — PROGRESS NOTE ADULT - PROBLEM SELECTOR PLAN 10
- abn appearing rectum on CT may be 2/2 proctitis or tumor, uro and GI c/s in AM, covering w/ abx above ON  - cardiomegaly on CT, collaborate w/ outpatient cards re prior TTE (proBNP elevated as well however confounded by poor renal clearance)
no immediate emergent intervention at this time at this time

## 2025-05-19 NOTE — DISCHARGE NOTE NURSING/CASE MANAGEMENT/SOCIAL WORK - FINANCIAL ASSISTANCE
[facility] provides services at a reduced cost to those who are determined to be eligible through [facility]’s financial assistance program. Information regarding [facility]’s financial assistance program can be found by going to [email or web address] or by calling [phone].

## 2025-05-19 NOTE — DISCHARGE NOTE PROVIDER - CARE PROVIDER_API CALL
Salvatore Galvan  Hematology  [address] [facility] [address]  [place] [address]  Phone: [phone]  Fax: [phone]  Follow Up Time: 1 week    Braxton Magallon  Phone: [phone]  Fax: (   )    -  Follow Up Time: 1 week    Rigoberto Saunders  Pulmonary Disease  [address] [place] [address]  Phone: [phone]  Fax: [phone]  Follow Up Time: Routine    Rajni Blue  Nephrology  [facility] [facility] [address]  [place] [address]  Phone: [phone]  Fax: [phone]  Follow Up Time: Routine

## 2025-05-19 NOTE — DISCHARGE NOTE NURSING/CASE MANAGEMENT/SOCIAL WORK - PATIENT PORTAL LINK FT
You can access the FollowMyHealth Patient Portal offered by [facility] by registering at the following website: [email or web address]. By joining [facility]’s FollowMyHealth portal, you will also be able to view your health information using other applications (apps) compatible with our system.

## 2025-05-19 NOTE — DISCHARGE NOTE PROVIDER - PROVIDER TOKENS
PROVIDER:[TOKEN:[[id number]],FOLLOWUP:[1 week]],FREE:[LAST:[Fine],FIRST:[Braxton],PHONE:[phone]],FAX:[(   )    -],FOLLOWUP:[1 week]],PROVIDER:[TOKEN:[[id number]],FOLLOWUP:[Routine]],PROVIDER:[TOKEN:[[id number]],FOLLOWUP:[Routine]]

## 2025-05-19 NOTE — PROGRESS NOTE ADULT - PROBLEM SELECTOR PROBLEM 10
Imaging abnormalities

## 2025-05-19 NOTE — PROGRESS NOTE ADULT - ASSESSMENT
85M current smoker (1/2 PPD x 70y), WTC exposure, HTN, HLD, anemia, osteonecrosis of jaw, adjustment d/o, constipation, epididymo-orchitis, urinary retention, prostate cancer with mets to bone, fall (w/ head trauma), p/w falls found to have b/l hydronephrosis with c/f bladder mass, abnormal renal fn w/o baseline to compare, and r/o PNA given RLL 1.3 cm nodular consolidative opacity. Palliative care called for GOC.

## 2025-05-19 NOTE — PROGRESS NOTE ADULT - ASSESSMENT
85M current smoker (1/2 PPD x 70y),  WTC exposure, HTN, HLD, anemia, osteonecrosis of jaw, adjustment d/o, constipation, epididymo-orchitis, urinary retention, prostate cancer with mets to bone, fall (w/ head trauma), p/w falls  reduction of all 3 cell lines   Demetrius in light of severe hydro   Low HCO3 which can be seen in metabolic acidosis   Wide spread metastatic disease     Plan:    1 Endo- Random cortisol was appropriate   2 Renal- Renal function stable    known hx if hydro and outpt   follow up   (supportive care given advanced comorbidities and near normalization of creatinine)   3 ID- C/w IV abx   4 CVS- C/w Toprol  mg daily  5 Prostate ca- mgmt per heme/ onc, Palliative eval      Palliative eval pending for [facility]       [facility]   [phone]

## 2025-05-19 NOTE — PROGRESS NOTE ADULT - PROBLEM SELECTOR PLAN 4
kps 50%
continue to monitor   heme follow up appreciated

## 2025-05-19 NOTE — PROGRESS NOTE ADULT - PROBLEM SELECTOR PROBLEM 3
Fall
Opacity of lung on imaging study

## 2025-05-19 NOTE — PROGRESS NOTE ADULT - PROBLEM SELECTOR PLAN 8
no active intervention at this time  palliative care evaluation noted  called by oncology attending Dr Luz  as per his conversation with patient's outpatient oncology attending, recommendations are for continuing  Xtandi rather than Casodex  will follow recommendations and discharge on Xtandi  palliative care follow up appreciated  DNR/DNI

## 2025-05-19 NOTE — PROGRESS NOTE ADULT - REASON FOR ADMISSION
Fall, hydronephrosis, abnormal renal fn

## 2025-05-19 NOTE — DISCHARGE NOTE PROVIDER - NSDCFUSCHEDAPPT_GEN_ALL_CORE_FT
[facility]  OPHTHALM [address] [facility] [address]  Scheduled Appointment: 07/09/2025    [facility]  OPHTHALM [address] [facility] [address]  Scheduled Appointment: 07/09/2025     [facility]  WTCPROGRA [address]  Scheduled Appointment: 06/26/2025    [facility]  OPHTHALM [address] [facility]  Scheduled Appointment: 07/09/2025    [facility]  OPHTHALM [address] [facility]  Scheduled Appointment: 07/09/2025

## 2025-05-19 NOTE — PROGRESS NOTE ADULT - SUBJECTIVE AND OBJECTIVE BOX
Patient is a 85y old  Male who presents with a chief complaint of Fall, hydronephrosis, abnormal renal fn (16 May 2025 08:44)    Patient seen and examined  resting comfortably  no acute overnight events reported    MEDICATIONS  (STANDING):  azithromycin  IVPB      azithromycin  IVPB 500 milliGRAM(s) IV Intermittent every 24 hours  bicalutamide 50 milliGRAM(s) Oral daily  cefTRIAXone   IVPB      cefTRIAXone   IVPB 1000 milliGRAM(s) IV Intermittent every 24 hours  enzalutamide 160 milliGRAM(s) Oral daily  heparin   Injectable 5000 Unit(s) SubCutaneous every 8 hours  megestrol 40 milliGRAM(s) Oral daily  metoprolol succinate  milliGRAM(s) Oral daily  polyethylene glycol 3350 17 Gram(s) Oral daily  QUEtiapine 50 milliGRAM(s) Oral at bedtime  rosuvastatin 10 milliGRAM(s) Oral at bedtime  senna 2 Tablet(s) Oral at bedtime  tamsulosin 0.4 milliGRAM(s) Oral two times a day    MEDICATIONS  (PRN):  acetaminophen     Tablet .. 650 milliGRAM(s) Oral every 6 hours PRN Temp greater or equal to 38C (100.4F), Mild Pain (1 - 3)      Vital Signs Last 24 Hrs  T(C): 36.7 (16 May 2025 08:55), Max: 37.7 (15 May 2025 21:25)  T(F): 98.1 (16 May 2025 08:55), Max: 99.9 (15 May 2025 21:25)  HR: 80 (16 May 2025 08:55) (70 - 86)  BP: 127/62 (16 May 2025 08:55) (96/56 - 159/79)  BP(mean): --  RR: 18 (16 May 2025 08:55) (18 - 19)  SpO2: 98% (16 May 2025 08:55) (95% - 98%)    Parameters below as of 16 May 2025 08:55  Patient On (Oxygen Delivery Method): room air      PE  Awake, alert  Anicteric, MMM  RRR  CTAB  Abd soft, NT, ND  No c/c                        7.0    3.09  )-----------( 80       ( 16 May 2025 07:00 )             22.7       05-15    137  |  107  |  40[H]  ----------------------------<  98  4.3   |  16[L]  |  1.26    Ca    9.0      15 May 2025 07:11  Phos  3.0     05-15  Mg     2.2     05-15    TPro  5.5[L]  /  Alb  2.9[L]  /  TBili  0.2  /  DBili  x   /  AST  20  /  ALT  <5[L]  /  AlkPhos  341[H]  05-15      
Patient is a 85y old  Male who presents with a chief complaint of Fall, hydronephrosis, abnormal renal fn (18 May 2025 13:57)    Pt seen and examined at bedside, no acute events. He has not yet been out of bed.     MEDICATIONS  (STANDING):  bicalutamide 50 milliGRAM(s) Oral daily  cefTRIAXone   IVPB      cefTRIAXone   IVPB 1000 milliGRAM(s) IV Intermittent every 24 hours  heparin   Injectable 5000 Unit(s) SubCutaneous every 8 hours  megestrol 40 milliGRAM(s) Oral daily  metoprolol succinate  milliGRAM(s) Oral daily  polyethylene glycol 3350 17 Gram(s) Oral daily  QUEtiapine 50 milliGRAM(s) Oral at bedtime  rosuvastatin 10 milliGRAM(s) Oral at bedtime  senna 2 Tablet(s) Oral at bedtime  tamsulosin 0.4 milliGRAM(s) Oral two times a day    MEDICATIONS  (PRN):  acetaminophen     Tablet .. 650 milliGRAM(s) Oral every 6 hours PRN Temp greater or equal to 38C (100.4F), Mild Pain (1 - 3)    Vital Signs Last 24 Hrs  T(C): 36.4 (19 May 2025 04:18), Max: 37 (18 May 2025 12:22)  T(F): 97.6 (19 May 2025 04:18), Max: 98.6 (18 May 2025 12:22)  HR: 73 (19 May 2025 04:18) (72 - 90)  BP: 138/72 (19 May 2025 04:18) (101/60 - 138/72)  BP(mean): --  RR: 18 (19 May 2025 04:18) (18 - 18)  SpO2: 99% (19 May 2025 04:18) (93% - 99%)    Parameters below as of 19 May 2025 04:18  Patient On (Oxygen Delivery Method): room air    PE  NAD  Awake, alert  Anicteric, MMM  RRR  CTAB  Abd soft, NT, ND  No c/c/e  No rash grossly    05-18    138  |  108  |  30[H]  ----------------------------<  96  4.2   |  17[L]  |  1.41[H]    Ca    8.8      18 May 2025 07:08        
SUBJECTIVE / OVERNIGHT EVENTS:    Patient seen and examined at bedside. Family at bedside. Denies any chest pain or SOB this am        --------------------------------------------------------------------------------------------  LABS:                        8.4    3.47  )-----------( 73       ( 17 May 2025 06:52 )             26.1     05-18    138  |  108  |  30[H]  ----------------------------<  96  4.2   |  17[L]  |  1.41[H]    Ca    8.8      18 May 2025 07:08        CAPILLARY BLOOD GLUCOSE            Urinalysis Basic - ( 18 May 2025 07:08 )    Color: x / Appearance: x / SG: x / pH: x  Gluc: 96 mg/dL / Ketone: x  / Bili: x / Urobili: x   Blood: x / Protein: x / Nitrite: x   Leuk Esterase: x / RBC: x / WBC x   Sq Epi: x / Non Sq Epi: x / Bacteria: x        RADIOLOGY & ADDITIONAL TESTS:     Imaging Personally Reviewed:  [x] YES  [ ] NO    Consultant(s) Notes Reviewed:  [x] YES  [ ] NO    MEDICATIONS  (STANDING):  azithromycin   Tablet 500 milliGRAM(s) Oral daily  cefTRIAXone   IVPB      cefTRIAXone   IVPB 1000 milliGRAM(s) IV Intermittent every 24 hours  enzalutamide 160 milliGRAM(s) Oral daily  heparin   Injectable 5000 Unit(s) SubCutaneous every 8 hours  megestrol 40 milliGRAM(s) Oral daily  metoprolol succinate  milliGRAM(s) Oral daily  polyethylene glycol 3350 17 Gram(s) Oral daily  QUEtiapine 50 milliGRAM(s) Oral at bedtime  rosuvastatin 10 milliGRAM(s) Oral at bedtime  senna 2 Tablet(s) Oral at bedtime  tamsulosin 0.4 milliGRAM(s) Oral two times a day    MEDICATIONS  (PRN):  acetaminophen     Tablet .. 650 milliGRAM(s) Oral every 6 hours PRN Temp greater or equal to 38C (100.4F), Mild Pain (1 - 3)      Care Discussed with Consultants/Other Providers [x] YES  [ ] NO    Vital Signs Last 24 Hrs  T(C): 37.1 (18 May 2025 04:43), Max: 37.2 (17 May 2025 21:25)  T(F): 98.8 (18 May 2025 04:43), Max: 98.9 (17 May 2025 21:25)  HR: 85 (18 May 2025 04:43) (68 - 85)  BP: 145/71 (18 May 2025 04:43) (110/73 - 145/71)  BP(mean): --  RR: 18 (18 May 2025 04:43) (17 - 18)  SpO2: 99% (18 May 2025 04:43) (99% - 99%)    Parameters below as of 18 May 2025 04:43  Patient On (Oxygen Delivery Method): room air      I&O's Summary      PHYSICAL EXAM:  Constitutional: NAD; cachetic   Neck:  No JVD  Respiratory: Diminished  Cardiovascular: S1 and S2  Gastrointestinal: BS+, soft, NT/ND  Extremities: No peripheral edema  Neurological: A/O x 3, no focal deficits  Psychiatric: Normal mood, normal affect  : No Wheeler  Skin: No rashes, Head dressing in place  Access: Not applicable  
NEPHROLOGY-[facility] [phone]        Patient seen and examined in bed.  He was the same         MEDICATIONS  (STANDING):  bicalutamide 50 milliGRAM(s) Oral daily  cefTRIAXone   IVPB      cefTRIAXone   IVPB 1000 milliGRAM(s) IV Intermittent every 24 hours  heparin   Injectable 5000 Unit(s) SubCutaneous every 8 hours  megestrol 40 milliGRAM(s) Oral daily  metoprolol succinate  milliGRAM(s) Oral daily  polyethylene glycol 3350 17 Gram(s) Oral daily  QUEtiapine 50 milliGRAM(s) Oral at bedtime  rosuvastatin 10 milliGRAM(s) Oral at bedtime  senna 2 Tablet(s) Oral at bedtime  tamsulosin 0.4 milliGRAM(s) Oral two times a day      VITAL:  T(C): , Max: 36.6 (05-18-25 @ 19:28)  T(F): , Max: 97.9 (05-18-25 @ 19:28)  HR: 90 (05-19-25 @ 13:33)  BP: 100/60 (05-19-25 @ 13:33)  BP(mean): --  RR: 18 (05-19-25 @ 13:33)  SpO2: 99% (05-19-25 @ 13:33)  Wt(kg): --    I and O's:    05-18 @ 07:01  -  05-19 @ 07:00  --------------------------------------------------------  IN: 50 mL / OUT: 600 mL / NET: -550 mL          PHYSICAL EXAM:    Constitutional: NAD  Neck:  No JVD  Respiratory: CTAB/L  Cardiovascular: S1 and S2  Gastrointestinal: BS+, soft, NT/ND  Extremities: No peripheral edema  Neurological: A/O x 3, no focal deficits  Psychiatric: Normal mood, normal affect  : No Wheeler  Skin: No rashes  Access: Not applicable    LABS:    05-18    138  |  108  |  30[H]  ----------------------------<  96  4.2   |  17[L]  |  1.41[H]    Ca    8.8      18 May 2025 07:08            Urine Studies:  Urinalysis Basic - ( 18 May 2025 07:08 )    Color: x / Appearance: x / SG: x / pH: x  Gluc: 96 mg/dL / Ketone: x  / Bili: x / Urobili: x   Blood: x / Protein: x / Nitrite: x   Leuk Esterase: x / RBC: x / WBC x   Sq Epi: x / Non Sq Epi: x / Bacteria: x            RADIOLOGY & ADDITIONAL STUDIES:            
NEPHROLOGY-[facility] [phone]        Patient seen and examined in bed.  He was the same         MEDICATIONS  (STANDING):  azithromycin  IVPB      azithromycin  IVPB 500 milliGRAM(s) IV Intermittent every 24 hours  bicalutamide 50 milliGRAM(s) Oral daily  cefTRIAXone   IVPB      cefTRIAXone   IVPB 1000 milliGRAM(s) IV Intermittent every 24 hours  enzalutamide 160 milliGRAM(s) Oral daily  heparin   Injectable 5000 Unit(s) SubCutaneous every 8 hours  megestrol 40 milliGRAM(s) Oral daily  metoprolol succinate  milliGRAM(s) Oral daily  polyethylene glycol 3350 17 Gram(s) Oral daily  QUEtiapine 50 milliGRAM(s) Oral at bedtime  rosuvastatin 10 milliGRAM(s) Oral at bedtime  senna 2 Tablet(s) Oral at bedtime  tamsulosin 0.4 milliGRAM(s) Oral two times a day      VITAL:  T(C): , Max: 37.7 (05-15-25 @ 21:25)  T(F): , Max: 99.9 (05-15-25 @ 21:25)  HR: 79 (05-16-25 @ 05:25)  BP: 122/66 (05-16-25 @ 05:25)  BP(mean): --  RR: 18 (05-16-25 @ 05:25)  SpO2: 98% (05-16-25 @ 05:25)  Wt(kg): --    I and O's:    05-15 @ 07:01  -  05-16 @ 07:00  --------------------------------------------------------  IN: 500 mL / OUT: 750 mL / NET: -250 mL          PHYSICAL EXAM:    Constitutional: NAD; cachetic   Neck:  No JVD  Respiratory: reduced   Cardiovascular: S1 and S2  Gastrointestinal: BS+, soft, NT/ND  Extremities: No peripheral edema  Neurological: A/O x 3, no focal deficits  Psychiatric: Normal mood, normal affect  : No Wheeler  Skin: No rashes  Access: Not applicable    LABS:                        7.0    3.09  )-----------( 80       ( 16 May 2025 07:00 )             22.7     05-15    137  |  107  |  40[H]  ----------------------------<  98  4.3   |  16[L]  |  1.26    Ca    9.0      15 May 2025 07:11  Phos  3.0     05-15  Mg     2.2     05-15    TPro  5.5[L]  /  Alb  2.9[L]  /  TBili  0.2  /  DBili  x   /  AST  20  /  ALT  <5[L]  /  AlkPhos  341[H]  05-15          Urine Studies:  Urinalysis Basic - ( 15 May 2025 07:11 )    Color: x / Appearance: x / SG: x / pH: x  Gluc: 98 mg/dL / Ketone: x  / Bili: x / Urobili: x   Blood: x / Protein: x / Nitrite: x   Leuk Esterase: x / RBC: x / WBC x   Sq Epi: x / Non Sq Epi: x / Bacteria: x      Sodium, Random Urine: 88 mmol/L (05-15 @ 07:04)  Creatinine, Random Urine: 24 mg/dL (05-15 @ 07:04)  Protein/Creatinine Ratio Calculation: 1.2 Ratio (05-15 @ 07:04)        RADIOLOGY & ADDITIONAL STUDIES:        < from: CT Abdomen and Pelvis w/ IV Cont (05.14.25 @ 22:03) >    ACC: [id number] EXAM:  CT ABDOMEN AND PELVIS IC   ORDERED BY:  ANIKET GONG     PROCEDURE DATE:  05/14/2025          INTERPRETATION:  CLINICAL INFORMATION: Acute renal insufficiency and   bilateral hydronephrosis.    COMPARISON: None.    CONTRAST/COMPLICATIONS:  IV Contrast: Omnipaque 350  90 cc administered   10 cc discarded  Oral Contrast: NONE  .    PROCEDURE:  CT of the Abdomen and Pelvis was performed.  Sagittal and coronal reformats were performed.    FINDINGS:  LOWER CHEST: A 1.3 cm right lower lobe nodular opacity and additional   tree-in-bud opacities.    LIVER: Within normal limits.  BILE DUCTS: Normal caliber.  GALLBLADDER: Contracted with stones.  SPLEEN: Within normal limits.  PANCREAS: Within normal limits.  ADRENALS: Within normal limits.  KIDNEYS/URETERS: Severe left hydronephrosis to the level of the abnormal   prostate gland. Likely multiple bilateral cortical cysts. Delayed   nephrogram on the left with excretion of contrast on the right with mild   right hydronephrosisto the level of the urinary bladder..    BLADDER: Within normal limits.  REPRODUCTIVE ORGANS: Prostate gland is markedly enlarged with ill-defined   soft tissue density extending outside of the capsule to the left. There   is asymmetric extension of soft tissue posteriorly from the prostate   gland to the rectum with asymmetric wall thickening to the right of   rectum (3, 121). Presacral fatty stranding. Seminal vesicles are not   visualized and obscured from prostate mass.    BOWEL: No bowel obstruction. Small hiatal hernia.  PERITONEUM/RETROPERITONEUM: Trace free fluid in the abdomen and pelvis.  VESSELS: Atherosclerotic changes.  LYMPH NODES: Enlarged lymph nodes in the retroperitoneum.  ABDOMINAL WALL: Within normal limits.  BONES: Extensive sclerotic bony metastatic disease.    IMPRESSION:  Findings are most suggestive of prostate cancer with associated severe   left and mild right hydronephrosis. Involvement of the seminal vesicles,   urinary bladder and the adjacent rectum.  Extensive bony metastatic disease.      --- End of Report ---      < end of copied text >      
Date of Service: 05-18-25 @ 13:58    Patient is a 85y old  Male who presents with a chief complaint of Fall, hydronephrosis, abnormal renal fn (18 May 2025 12:57)      Any change in ROS: he is alert and awake and wants to go home:  on room air     MEDICATIONS  (STANDING):  cefTRIAXone   IVPB      cefTRIAXone   IVPB 1000 milliGRAM(s) IV Intermittent every 24 hours  enzalutamide 160 milliGRAM(s) Oral daily  heparin   Injectable 5000 Unit(s) SubCutaneous every 8 hours  megestrol 40 milliGRAM(s) Oral daily  metoprolol succinate  milliGRAM(s) Oral daily  polyethylene glycol 3350 17 Gram(s) Oral daily  QUEtiapine 50 milliGRAM(s) Oral at bedtime  rosuvastatin 10 milliGRAM(s) Oral at bedtime  senna 2 Tablet(s) Oral at bedtime  tamsulosin 0.4 milliGRAM(s) Oral two times a day    MEDICATIONS  (PRN):  acetaminophen     Tablet .. 650 milliGRAM(s) Oral every 6 hours PRN Temp greater or equal to 38C (100.4F), Mild Pain (1 - 3)    Vital Signs Last 24 Hrs  T(C): 37 (18 May 2025 12:22), Max: 37.2 (17 May 2025 21:25)  T(F): 98.6 (18 May 2025 12:22), Max: 98.9 (17 May 2025 21:25)  HR: 90 (18 May 2025 12:22) (75 - 90)  BP: 101/60 (18 May 2025 12:22) (101/60 - 145/71)  BP(mean): --  RR: 18 (18 May 2025 12:22) (18 - 18)  SpO2: 93% (18 May 2025 12:22) (93% - 99%)    Parameters below as of 18 May 2025 12:22  Patient On (Oxygen Delivery Method): room air        I&O's Summary        Physical Exam:   GENERAL: NAD, well-groomed, well-developed  HEENT: IVONNE/   Atraumatic, Normocephalic  ENMT: No tonsillar erythema, exudates, or enlargement; Moist mucous membranes, Good dentition, No lesions  NECK: Supple, No JVD, Normal thyroid  CHEST/LUNG: Clear to auscultaion  CVS: Regular rate and rhythm; No murmurs, rubs, or gallops  GI: : Soft, Nontender, Nondistended; Bowel sounds present  NERVOUS SYSTEM:  Alert & Oriented X2-3  EXTREMITIES: -edema  LYMPH: No lymphadenopathy noted  SKIN: No rashes or lesions  ENDOCRINOLOGY: No Thyromegaly  PSYCH: calm     Labs:  26                            8.4    3.47  )-----------( 73       ( 17 May 2025 06:52 )             26.1                         8.4    3.69  )-----------( 76       ( 16 May 2025 17:11 )             27.0                         7.0    3.09  )-----------( 80       ( 16 May 2025 07:00 )             22.7                         7.1    3.57  )-----------( 86       ( 15 May 2025 07:12 )             22.5                         8.3    3.50  )-----------( 107      ( 14 May 2025 14:35 )             25.2     05-18    138  |  108  |  30[H]  ----------------------------<  96  4.2   |  17[L]  |  1.41[H]  05-17    137  |  106  |  35[H]  ----------------------------<  115[H]  4.1   |  16[L]  |  1.55[H]  05-15    137  |  107  |  40[H]  ----------------------------<  98  4.3   |  16[L]  |  1.26  05-14    140  |  103  |  47[H]  ----------------------------<  97  4.3   |  22  |  1.47[H]    Ca    8.8      18 May 2025 07:08  Ca    8.9      17 May 2025 06:52    TPro  5.5[L]  /  Alb  2.9[L]  /  TBili  0.2  /  DBili  x   /  AST  20  /  ALT  <5[L]  /  AlkPhos  341[H]  05-15  TPro  6.7  /  Alb  3.9  /  TBili  0.2  /  DBili  x   /  AST  15  /  ALT  <5[L]  /  AlkPhos  388[H]  05-14    CAPILLARY BLOOD GLUCOSE              Urinalysis Basic - ( 18 May 2025 07:08 )    Color: x / Appearance: x / SG: x / pH: x  Gluc: 96 mg/dL / Ketone: x  / Bili: x / Urobili: x   Blood: x / Protein: x / Nitrite: x   Leuk Esterase: x / RBC: x / WBC x   Sq Epi: x / Non Sq Epi: x / Bacteria: x      D-Dimer Assay, Quantitative: 1040 ng/mL DDU (05-14 @ 14:35)  Procalcitonin: 0.08 ng/mL (05-15 @ 07:11)        RECENT CULTURES:  05-14 @ 14:35 Blood Blood     rad< from: CT Angio Chest PE Protocol w/ IV Cont (05.14.25 @ 19:29) >    No acute pulmonary embolism.    Right lower lobe 1.3 cm nodular consolidative opacity; this is   indeterminate, but may be infectious/inflammatory in etiology. CT chest   follow-up in 3 months recommended to ensure clearing.    Severe left-sided hydronephrosis. Right hydronephrosis and/or parapelvic   cysts. Recommend dedicated CT abdomen and pelvis to further assess.    Diffuse sclerotic lesions throughout the osseous structures; this may   represent metastatic disease.    --- End of Report ---    < end of copied text >             No growth at 72 Hours          RESPIRATORY CULTURES:          Studies  Chest X-RAY  CT SCAN Chest   Venous Dopplers: LE:   CT Abdomen  Others              
Date of Service: 05-19-25 @ 13:10    Patient is a 85y old  Male who presents with a chief complaint of Fall, hydronephrosis, abnormal renal fn (19 May 2025 11:24)      Any change in ROS: she seems OK:  no sob:  no cough :   no phlegm     MEDICATIONS  (STANDING):  bicalutamide 50 milliGRAM(s) Oral daily  cefTRIAXone   IVPB      cefTRIAXone   IVPB 1000 milliGRAM(s) IV Intermittent every 24 hours  heparin   Injectable 5000 Unit(s) SubCutaneous every 8 hours  megestrol 40 milliGRAM(s) Oral daily  metoprolol succinate  milliGRAM(s) Oral daily  polyethylene glycol 3350 17 Gram(s) Oral daily  QUEtiapine 50 milliGRAM(s) Oral at bedtime  rosuvastatin 10 milliGRAM(s) Oral at bedtime  senna 2 Tablet(s) Oral at bedtime  tamsulosin 0.4 milliGRAM(s) Oral two times a day    MEDICATIONS  (PRN):  acetaminophen     Tablet .. 650 milliGRAM(s) Oral every 6 hours PRN Temp greater or equal to 38C (100.4F), Mild Pain (1 - 3)    Vital Signs Last 24 Hrs  T(C): 36.3 (19 May 2025 13:05), Max: 36.6 (18 May 2025 19:28)  T(F): 97.3 (19 May 2025 13:05), Max: 97.9 (18 May 2025 19:28)  HR: 77 (19 May 2025 13:05) (72 - 77)  BP: 138/82 (19 May 2025 13:05) (133/74 - 138/82)  BP(mean): --  RR: 19 (19 May 2025 13:05) (18 - 19)  SpO2: 99% (19 May 2025 13:05) (99% - 99%)    Parameters below as of 19 May 2025 13:05  Patient On (Oxygen Delivery Method): room air        I&O's Summary    18 May 2025 07:01  -  19 May 2025 07:00  --------------------------------------------------------  IN: 50 mL / OUT: 600 mL / NET: -550 mL          Physical Exam:   GENERAL: NAD, well-groomed, well-developed  HEENT: IVONNE/   Atraumatic, Normocephalic  ENMT: No tonsillar erythema, exudates, or enlargement; Moist mucous membranes, Good dentition, No lesions  NECK: Supple, No JVD, Normal thyroid  CHEST/LUNG: Clear to auscultaion  CVS: Regular rate and rhythm; No murmurs, rubs, or gallops  GI: : Soft, Nontender, Nondistended; Bowel sounds present  NERVOUS SYSTEM:  Alert & Oriented X3  EXTREMITIES:  - edema  LYMPH: No lymphadenopathy noted  SKIN: No rashes or lesions  ENDOCRINOLOGY: No Thyromegaly  PSYCH: Appropriate    Labs:  26                            8.4    3.47  )-----------( 73       ( 17 May 2025 06:52 )             26.1                         8.4    3.69  )-----------( 76       ( 16 May 2025 17:11 )             27.0                         7.0    3.09  )-----------( 80       ( 16 May 2025 07:00 )             22.7     05-18    138  |  108  |  30[H]  ----------------------------<  96  4.2   |  17[L]  |  1.41[H]  05-17    137  |  106  |  35[H]  ----------------------------<  115[H]  4.1   |  16[L]  |  1.55[H]    Ca    8.8      18 May 2025 07:08      CAPILLARY BLOOD GLUCOSE              Urinalysis Basic - ( 18 May 2025 07:08 )    Color: x / Appearance: x / SG: x / pH: x  Gluc: 96 mg/dL / Ketone: x  / Bili: x / Urobili: x   Blood: x / Protein: x / Nitrite: x   Leuk Esterase: x / RBC: x / WBC x   Sq Epi: x / Non Sq Epi: x / Bacteria: x      D-Dimer Assay, Quantitative: 1040 ng/mL DDU (05-14 @ 14:35)        RECENT CULTURES:  05-14 @ 14:35 Blood Blood       rad< from: CT Abdomen and Pelvis w/ IV Cont (05.14.25 @ 22:03) >  IMPRESSION:  Findings are most suggestive of prostate cancer with associated severe   left and mild right hydronephrosis. Involvement of the seminal vesicles,   urinary bladder and the adjacent rectum.  Extensive bony metastatic disease.    < end of copied text >  < from: CT Angio Chest PE Protocol w/ IV Cont (05.14.25 @ 19:29) >  IMPRESSION:    No acute pulmonary embolism.    Right lower lobe 1.3 cm nodular consolidative opacity; this is   indeterminate, but may be infectious/inflammatory in etiology. CT chest   follow-up in 3 months recommended to ensure clearing.    Severe left-sided hydronephrosis. Right hydronephrosis and/or parapelvic   cysts. Recommend dedicated CT abdomen and pelvis to further assess.    Diffuse sclerotic lesions throughout the osseous structures; this may   represent metastatic disease.    < end of copied text >           No growth at 4 days          RESPIRATORY CULTURES:          Studies  Chest X-RAY  CT SCAN Chest   Venous Dopplers: LE:   CT Abdomen  Others              
Patient is a 85y old  Male who presents with a chief complaint of Fall, hydronephrosis, abnormal renal fn (16 May 2025 14:16)      DATE OF SERVICE: 05-17-25 @ 12:43    SUBJECTIVE / OVERNIGHT EVENTS: overnight events noted    ROS:  Resp: No cough no sputum production  CVS: No chest pain no palpitations no orthopnea  GI: no N/V/D  'I feel fine'       MEDICATIONS  (STANDING):  azithromycin  IVPB      azithromycin  IVPB 500 milliGRAM(s) IV Intermittent every 24 hours  cefTRIAXone   IVPB      cefTRIAXone   IVPB 1000 milliGRAM(s) IV Intermittent every 24 hours  enzalutamide 160 milliGRAM(s) Oral daily  heparin   Injectable 5000 Unit(s) SubCutaneous every 8 hours  megestrol 40 milliGRAM(s) Oral daily  metoprolol succinate  milliGRAM(s) Oral daily  polyethylene glycol 3350 17 Gram(s) Oral daily  QUEtiapine 50 milliGRAM(s) Oral at bedtime  rosuvastatin 10 milliGRAM(s) Oral at bedtime  senna 2 Tablet(s) Oral at bedtime  tamsulosin 0.4 milliGRAM(s) Oral two times a day    MEDICATIONS  (PRN):  acetaminophen     Tablet .. 650 milliGRAM(s) Oral every 6 hours PRN Temp greater or equal to 38C (100.4F), Mild Pain (1 - 3)        CAPILLARY BLOOD GLUCOSE        I&O's Summary    16 May 2025 07:01  -  17 May 2025 07:00  --------------------------------------------------------  IN: 720 mL / OUT: 1150 mL / NET: -430 mL        Vital Signs Last 24 Hrs  T(C): 36.8 (17 May 2025 05:06), Max: 36.8 (17 May 2025 05:06)  T(F): 98.2 (17 May 2025 05:06), Max: 98.2 (17 May 2025 05:06)  HR: 79 (17 May 2025 05:06) (79 - 100)  BP: 107/62 (17 May 2025 05:06) (101/65 - 132/75)  BP(mean): --  RR: 17 (17 May 2025 05:06) (17 - 18)  SpO2: 97% (17 May 2025 05:06) (97% - 98%)      PHYSICAL EXAM:  CHEST/LUNG: clear  HEART: S1 S2; no murmurs   ABDOMEN: Soft, Nontender  EXTREMITIES: no edema  NEUROLOGY: Alert non-focal    LABS:                        8.4    3.47  )-----------( 73       ( 17 May 2025 06:52 )             26.1     05-17    137  |  106  |  35[H]  ----------------------------<  115[H]  4.1   |  16[L]  |  1.55[H]    Ca    8.9      17 May 2025 06:52      PT/INR - ( 16 May 2025 06:58 )   PT: 10.4 sec;   INR: 0.90 ratio         PTT - ( 16 May 2025 06:58 )  PTT:30.7 sec      Urinalysis Basic - ( 17 May 2025 06:52 )    Color: x / Appearance: x / SG: x / pH: x  Gluc: 115 mg/dL / Ketone: x  / Bili: x / Urobili: x   Blood: x / Protein: x / Nitrite: x   Leuk Esterase: x / RBC: x / WBC x   Sq Epi: x / Non Sq Epi: x / Bacteria: x          All consultant(s) notes reviewed and care discussed with other providers        Contact Number, Dr Fitzpatrick [phone]
Patient is a 85y old  Male who presents with a chief complaint of Fall, hydronephrosis, abnormal renal fn (19 May 2025 13:29)      DATE OF SERVICE: 05-19-25 @ 14:27    SUBJECTIVE / OVERNIGHT EVENTS: overnight events noted    ROS:  Resp: No cough no sputum production  CVS: No chest pain no palpitations no orthopnea  GI: no N/V/D    MEDICATIONS  (STANDING):  bicalutamide 50 milliGRAM(s) Oral daily  cefTRIAXone   IVPB      cefTRIAXone   IVPB 1000 milliGRAM(s) IV Intermittent every 24 hours  heparin   Injectable 5000 Unit(s) SubCutaneous every 8 hours  megestrol 40 milliGRAM(s) Oral daily  metoprolol succinate  milliGRAM(s) Oral daily  polyethylene glycol 3350 17 Gram(s) Oral daily  QUEtiapine 50 milliGRAM(s) Oral at bedtime  rosuvastatin 10 milliGRAM(s) Oral at bedtime  senna 2 Tablet(s) Oral at bedtime  tamsulosin 0.4 milliGRAM(s) Oral two times a day    MEDICATIONS  (PRN):  acetaminophen     Tablet .. 650 milliGRAM(s) Oral every 6 hours PRN Temp greater or equal to 38C (100.4F), Mild Pain (1 - 3)        CAPILLARY BLOOD GLUCOSE        I&O's Summary    18 May 2025 07:01  -  19 May 2025 07:00  --------------------------------------------------------  IN: 50 mL / OUT: 600 mL / NET: -550 mL        Vital Signs Last 24 Hrs  T(C): 36.3 (19 May 2025 13:05), Max: 36.6 (18 May 2025 19:28)  T(F): 97.3 (19 May 2025 13:05), Max: 97.9 (18 May 2025 19:28)  HR: 90 (19 May 2025 13:33) (72 - 90)  BP: 100/60 (19 May 2025 13:33) (100/60 - 138/82)  BP(mean): --  RR: 18 (19 May 2025 13:33) (18 - 19)  SpO2: 99% (19 May 2025 13:33) (99% - 99%)    PHYSICAL EXAM:  CHEST/LUNG: clear  HEART: S1 S2; no murmurs   ABDOMEN: Soft, Nontender  EXTREMITIES: no edema  NEUROLOGY: Alert non-focal    LABS:    05-18    138  |  108  |  30[H]  ----------------------------<  96  4.2   |  17[L]  |  1.41[H]    Ca    8.8      18 May 2025 07:08            Urinalysis Basic - ( 18 May 2025 07:08 )    Color: x / Appearance: x / SG: x / pH: x  Gluc: 96 mg/dL / Ketone: x  / Bili: x / Urobili: x   Blood: x / Protein: x / Nitrite: x   Leuk Esterase: x / RBC: x / WBC x   Sq Epi: x / Non Sq Epi: x / Bacteria: x          All consultant(s) notes reviewed and care discussed with other providers        Contact Number, Dr Fitzpatrick [phone]
Patient is a 85y old  Male who presents with a chief complaint of Fall, hydronephrosis, abnormal renal fn (15 May 2025 11:33)  patient not known to me, assigned this AM to assume care  chart reviewed and events thus far noted  admitted overnight by hospitalist colleague     DATE OF SERVICE: 05-15-25 @ 14:24    SUBJECTIVE / OVERNIGHT EVENTS: overnight events noted    ROS:  Resp: No cough no sputum production  CVS: No chest pain no palpitations no orthopnea  GI: no N/V/D      MEDICATIONS  (STANDING):  azithromycin  IVPB      cefTRIAXone   IVPB      heparin   Injectable 5000 Unit(s) SubCutaneous every 8 hours  metoprolol succinate  milliGRAM(s) Oral daily  polyethylene glycol 3350 17 Gram(s) Oral daily  rosuvastatin 10 milliGRAM(s) Oral at bedtime  senna 2 Tablet(s) Oral at bedtime  tamsulosin 0.4 milliGRAM(s) Oral two times a day    MEDICATIONS  (PRN):  acetaminophen     Tablet .. 650 milliGRAM(s) Oral every 6 hours PRN Temp greater or equal to 38C (100.4F), Mild Pain (1 - 3)        CAPILLARY BLOOD GLUCOSE        I&O's Summary    14 May 2025 07:01  -  15 May 2025 07:00  --------------------------------------------------------  IN: 350 mL / OUT: 500 mL / NET: -150 mL    15 May 2025 07:01  -  15 May 2025 14:24  --------------------------------------------------------  IN: 0 mL / OUT: 200 mL / NET: -200 mL        Vital Signs Last 24 Hrs  T(C): 36.6 (15 May 2025 12:37), Max: 36.6 (14 May 2025 18:47)  T(F): 97.9 (15 May 2025 12:37), Max: 97.9 (15 May 2025 08:59)  HR: 70 (15 May 2025 12:37) (70 - 83)  BP: 96/56 (15 May 2025 12:37) (94/56 - 147/77)  BP(mean): 85 (14 May 2025 15:09) (85 - 85)  RR: 18 (15 May 2025 12:37) (15 - 19)  SpO2: 98% (15 May 2025 12:37) (95% - 100%)    PHYSICAL EXAM:  NECK: Supple  CHEST/LUNG: clear  HEART: S1 S2; no murmurs   ABDOMEN: Soft, Nontender  EXTREMITIES: no edema  NEUROLOGY: AO x 3 non-focal      LABS:                        7.1    3.57  )-----------( 86       ( 15 May 2025 07:12 )             22.5     05-15    137  |  107  |  40[H]  ----------------------------<  98  4.3   |  16[L]  |  1.26    Ca    9.0      15 May 2025 07:11  Phos  3.0     05-15  Mg     2.2     05-15    TPro  5.5[L]  /  Alb  2.9[L]  /  TBili  0.2  /  DBili  x   /  AST  20  /  ALT  <5[L]  /  AlkPhos  341[H]  05-15    PT/INR - ( 14 May 2025 14:35 )   PT: 10.0 sec;   INR: 0.88 ratio         PTT - ( 14 May 2025 14:35 )  PTT:31.4 sec      Urinalysis Basic - ( 15 May 2025 07:11 )    Color: x / Appearance: x / SG: x / pH: x  Gluc: 98 mg/dL / Ketone: x  / Bili: x / Urobili: x   Blood: x / Protein: x / Nitrite: x   Leuk Esterase: x / RBC: x / WBC x   Sq Epi: x / Non Sq Epi: x / Bacteria: x          All consultant(s) notes reviewed and care discussed with other providers        Contact Number, Dr Fitzpatrick [phone]
Time and date of service: 05-19-25 @ 13:30      SUBJECTIVE AND OBJECTIVE: Pt resting in bed without complaints in NAD  Indication for Geriatrics and Palliative Care Services/INTERVAL HPI: goc    OVERNIGHT EVENTS: no acute events    DNR on chart:DNI  DNI      Allergies    No Known Allergies    Intolerances    MEDICATIONS  (STANDING):  bicalutamide 50 milliGRAM(s) Oral daily  cefTRIAXone   IVPB      cefTRIAXone   IVPB 1000 milliGRAM(s) IV Intermittent every 24 hours  heparin   Injectable 5000 Unit(s) SubCutaneous every 8 hours  megestrol 40 milliGRAM(s) Oral daily  metoprolol succinate  milliGRAM(s) Oral daily  polyethylene glycol 3350 17 Gram(s) Oral daily  QUEtiapine 50 milliGRAM(s) Oral at bedtime  rosuvastatin 10 milliGRAM(s) Oral at bedtime  senna 2 Tablet(s) Oral at bedtime  tamsulosin 0.4 milliGRAM(s) Oral two times a day    MEDICATIONS  (PRN):  acetaminophen     Tablet .. 650 milliGRAM(s) Oral every 6 hours PRN Temp greater or equal to 38C (100.4F), Mild Pain (1 - 3)        ITEMS UNCHECKED ARE NOT PRESENT    PRESENT SYMPTOMS: [ ]Unable to self-report - see [ ] CPOT [ ] PAINADS [ ] RDOS  Source if other than patient:  [ ]Family   [ ]Team     Pain:  [ ]yes [x ]no  QOL impact -   Location -                    Aggravating factors -  Quality -  Radiation -  Timing-  Severity (0-10 scale):  Minimal acceptable level (0-10 scale):     CPOT:    https://www.[facility].org/getattachment/hho92b68-7l5r-2m5e-6m2f-3358s6139z4t/Critical-Care-Pain-Observation-Tool-(CPOT)    PAIN AD Score:	  http://geriatrictoolkit.[place].[facility]/cog/painad.pdf (Ctrl + left click to view)    Dyspnea:                           [ ]Mild [ ]Moderate [ ]Severe    RDOS:  0 to 2  minimal or no respiratory distress   3  mild distress  4 to 6 moderate distress  >7 severe distress  https://homecareinformation.net/handouts/hen/Respiratory_Distress_Observation_Scale.pdf (Ctrl +  left click to view)     Anxiety:                             [ ]Mild [ ]Moderate [ ]Severe  Fatigue:                             [ ]Mild [ ]Moderate [ ]Severe  Nausea:                             [ ]Mild [ ]Moderate [ ]Severe  Loss of appetite:               [ ]Mild [ ]Moderate [ ]Severe  Constipation:                    [ ]Mild [ ]Moderate [ ]Severe    PCSSQ[Palliative Care Spiritual Screening Question]   Severity (0-10):  Score of 4 or > indicate consideration of Chaplaincy referral.  Chaplaincy Referral: [ ] yes [ ] refused [ ] following [x ] Deferred     Caregiver [place]? : [ ] yes [ ] no [ x] Deferred [ ] Declined             Social work referral [ ] Patient & Family Centered Care Referral [ ]     Anticipatory Grief present?:  [ ] yes [ ] no  [ x] Deferred                  Social work referral [ ] Chaplaincy Referral[ ]      Other Symptoms:  [x ]All other review of systems negative     Palliative Performance Status Version 2:     60    %      http://[facility].org/files/news/palliative_performance_scale_ppsv2.pdf  PHYSICAL EXAM:  Vital Signs Last 24 Hrs  T(C): 36.3 (19 May 2025 13:05), Max: 36.6 (18 May 2025 19:28)  T(F): 97.3 (19 May 2025 13:05), Max: 97.9 (18 May 2025 19:28)  HR: 77 (19 May 2025 13:05) (72 - 77)  BP: 138/82 (19 May 2025 13:05) (133/74 - 138/82)  BP(mean): --  RR: 19 (19 May 2025 13:05) (18 - 19)  SpO2: 99% (19 May 2025 13:05) (99% - 99%)    Parameters below as of 19 May 2025 13:05  Patient On (Oxygen Delivery Method): room air     I&O's Summary    18 May 2025 07:01  -  19 May 2025 07:00  --------------------------------------------------------  IN: 50 mL / OUT: 600 mL / NET: -550 mL       GENERAL: [x ]Cachexia    [x ]Alert  x[ ]Oriented x   [ ]Lethargic  [ ]Unarousable  [x ]Verbal  [ ]Non-Verbal  Behavioral:   [ ]Anxiety  [ ]Delirium [ ]Agitation [ ]Other  HEENT:  [x ]Normal   [ ]Dry mouth   [ ]ET Tube/Trach  [ ]Oral lesions  PULMONARY:   [x ]Clear [ ]Tachypnea  [ ]Audible excessive secretions   [ ]Rhonchi        [ ]Right [ ]Left [ ]Bilateral  [ ]Crackles        [ ]Right [ ]Left [ ]Bilateral  [ ]Wheezing     [ ]Right [ ]Left [ ]Bilateral  [ ]Diminished BS [ ] Right [ ]Left [ ]Bilateral  CARDIOVASCULAR:    [x ]Regular [ ]Irregular [ ]Tachy  [ ]Ming [ ]Murmur [ ]Other  GASTROINTESTINAL:  [x ]Soft  [ ]Distended   [ x]+BS  [ ]Non tender [ ]Tender  [ ]Other [ ]PEG [ ]OGT/ NGT   Last BM:   GENITOURINARY:  [ x]Normal [ ]Incontinent   [ ]Oliguria/Anuria   [ ]Wheeler  MUSCULOSKELETAL:   [x ]Normal   [ ]Weakness  [ ]Bed/Wheelchair bound [ ]Edema  NEUROLOGIC:   [x ]No focal deficits  [ ] Cognitive impairment  [ ] Dysphagia [ ]Dysarthria [ ] Paresis [ ]Other   SKIN:  ecchymosis  [x ]Normal  [ ]Rash  [ ]Other  [ ]Pressure ulcer(s) [ ]y [ ]n present on admission    CRITICAL CARE:  [ ]Shock Present  [ ]Septic [ ]Cardiogenic [ ]Neurologic [ ]Hypovolemic  [ ]Vasopressors [ ]Inotropes  [ ]Respiratory failure present [ ]Mechanical Ventilation [ ]Non-invasive ventilatory support [ ]High-Flow   [ ]Acute  [ ]Chronic [ ]Hypoxic  [ ]Hypercarbic [ ]Other  [ ]Other organ failure     LABS:  05-18    138  |  108  |  30[H]  ----------------------------<  96  4.2   |  17[L]  |  1.41[H]    Ca    8.8      18 May 2025 07:08        Urinalysis Basic - ( 18 May 2025 07:08 )    Color: x / Appearance: x / SG: x / pH: x  Gluc: 96 mg/dL / Ketone: x  / Bili: x / Urobili: x   Blood: x / Protein: x / Nitrite: x   Leuk Esterase: x / RBC: x / WBC x   Sq Epi: x / Non Sq Epi: x / Bacteria: x      RADIOLOGY & ADDITIONAL STUDIES:    < from: CT Abdomen and Pelvis w/ IV Cont (05.14.25 @ 22:03) >  ACC: [id number] EXAM:  CT ABDOMEN AND PELVIS IC   ORDERED BY:  ANIKET GONG     PROCEDURE DATE:  05/14/2025          INTERPRETATION:  CLINICAL INFORMATION: Acute renal insufficiency and   bilateral hydronephrosis.    COMPARISON: None.    CONTRAST/COMPLICATIONS:  IV Contrast: Omnipaque 350  90 cc administered   10 cc discarded  Oral Contrast: NONE  .    PROCEDURE:  CT of the Abdomen and Pelvis was performed.  Sagittal and coronal reformats were performed.    FINDINGS:  LOWER CHEST: A 1.3 cm right lower lobe nodular opacity and additional   tree-in-bud opacities.    LIVER: Within normal limits.  BILE DUCTS: Normal caliber.  GALLBLADDER: Contracted with stones.  SPLEEN: Within normal limits.  PANCREAS: Within normal limits.  ADRENALS: Within normal limits.  KIDNEYS/URETERS: Severe left hydronephrosis to the level of the abnormal   prostate gland. Likely multiple bilateral cortical cysts. Delayed   nephrogram on the left with excretion of contrast on the right with mild   right hydronephrosisto the level of the urinary bladder..    BLADDER: Within normal limits.  REPRODUCTIVE ORGANS: Prostate gland is markedly enlarged with ill-defined   soft tissue density extending outside of the capsule to the left. There   is asymmetric extension of soft tissue posteriorly from the prostate   gland to the rectum with asymmetric wall thickening to the right of   rectum (3, 121). Presacral fatty stranding. Seminal vesicles are not   visualized and obscured from prostate mass.    BOWEL: No bowel obstruction. Small hiatal hernia.  PERITONEUM/RETROPERITONEUM: Trace free fluid in the abdomen and pelvis.  VESSELS: Atherosclerotic changes.  LYMPH NODES: Enlarged lymph nodes in the retroperitoneum.  ABDOMINAL WALL: Within normal limits.  BONES: Extensive sclerotic bony metastatic disease.    IMPRESSION:  Findings are most suggestive of prostate cancer with associated severe   left and mild right hydronephrosis. Involvement of the seminal vesicles,   urinary bladder and the adjacent rectum.  Extensive bony metastatic disease.      --- End of Report ---            MAVIS CAI MD; Attending Radiologist  This document has been electronically signed. May 15 2025  1:47PM    < end of copied text >      Protein Calorie Malnutrition Present: [ ]mild [ ]moderate [ ]severe [ ]underweight [ ]morbid obesity  https://www.andeal.org/vault/2440/web/files/ONC/Table_Clinical%20Characteristics%20to%20Document%20Malnutrition-White%20JV%20et%20al%118845.pdf    Height (cm): 160 (05-15-25 @ 00:42)  Weight (kg): 66 (05-15-25 @ 00:42)  BMI (kg/m2): 25.8 (05-15-25 @ 00:42)    [ ]PPSV2 < or = 30%  [ ]significant weight loss [ ]poor nutritional intake [ ]anasarca[ ]Artificial Nutrition    Other REFERRALS:  [ ]Hospice  [ ]Child Life  [ ]Social Work  [ ]Case management [ ]Holistic Therapy     
Patient is a 85y old  Male who presents with a chief complaint of Fall, hydronephrosis, abnormal renal fn (18 May 2025 09:58)      DATE OF SERVICE: 05-18-25 @ 12:58    SUBJECTIVE / OVERNIGHT EVENTS: overnight events noted    ROS:  Resp: No cough no sputum production  CVS: No chest pain no palpitations no orthopnea  GI: no N/V/D  'I feel fine'     MEDICATIONS  (STANDING):  cefTRIAXone   IVPB      cefTRIAXone   IVPB 1000 milliGRAM(s) IV Intermittent every 24 hours  enzalutamide 160 milliGRAM(s) Oral daily  heparin   Injectable 5000 Unit(s) SubCutaneous every 8 hours  megestrol 40 milliGRAM(s) Oral daily  metoprolol succinate  milliGRAM(s) Oral daily  polyethylene glycol 3350 17 Gram(s) Oral daily  QUEtiapine 50 milliGRAM(s) Oral at bedtime  rosuvastatin 10 milliGRAM(s) Oral at bedtime  senna 2 Tablet(s) Oral at bedtime  tamsulosin 0.4 milliGRAM(s) Oral two times a day    MEDICATIONS  (PRN):  acetaminophen     Tablet .. 650 milliGRAM(s) Oral every 6 hours PRN Temp greater or equal to 38C (100.4F), Mild Pain (1 - 3)        CAPILLARY BLOOD GLUCOSE        I&O's Summary      Vital Signs Last 24 Hrs  T(C): 37 (18 May 2025 12:22), Max: 37.2 (17 May 2025 21:25)  T(F): 98.6 (18 May 2025 12:22), Max: 98.9 (17 May 2025 21:25)  HR: 90 (18 May 2025 12:22) (75 - 90)  BP: 101/60 (18 May 2025 12:22) (101/60 - 145/71)  BP(mean): --  RR: 18 (18 May 2025 12:22) (18 - 18)  SpO2: 93% (18 May 2025 12:22) (93% - 99%)      PHYSICAL EXAM:  CHEST/LUNG: clear  HEART: S1 S2; no murmurs   ABDOMEN: Soft, Nontender  EXTREMITIES: no edema  NEUROLOGY: Alert non-focal    LABS:                        8.4    3.47  )-----------( 73       ( 17 May 2025 06:52 )             26.1     05-18    138  |  108  |  30[H]  ----------------------------<  96  4.2   |  17[L]  |  1.41[H]    Ca    8.8      18 May 2025 07:08            Urinalysis Basic - ( 18 May 2025 07:08 )    Color: x / Appearance: x / SG: x / pH: x  Gluc: 96 mg/dL / Ketone: x  / Bili: x / Urobili: x   Blood: x / Protein: x / Nitrite: x   Leuk Esterase: x / RBC: x / WBC x   Sq Epi: x / Non Sq Epi: x / Bacteria: x          All consultant(s) notes reviewed and care discussed with other providers        Contact Number, Dr Fitzpatrick [phone]
Patient is a 85y old  Male who presents with a chief complaint of Fall, hydronephrosis, abnormal renal fn (16 May 2025 11:09)      DATE OF SERVICE: 05-16-25 @ 12:05    SUBJECTIVE / OVERNIGHT EVENTS: overnight events noted    ROS:  Resp: No cough no sputum production  CVS: No chest pain no palpitations no orthopnea  GI: no N/V/D        MEDICATIONS  (STANDING):  azithromycin  IVPB      azithromycin  IVPB 500 milliGRAM(s) IV Intermittent every 24 hours  bicalutamide 50 milliGRAM(s) Oral daily  cefTRIAXone   IVPB      cefTRIAXone   IVPB 1000 milliGRAM(s) IV Intermittent every 24 hours  enzalutamide 160 milliGRAM(s) Oral daily  heparin   Injectable 5000 Unit(s) SubCutaneous every 8 hours  megestrol 40 milliGRAM(s) Oral daily  metoprolol succinate  milliGRAM(s) Oral daily  polyethylene glycol 3350 17 Gram(s) Oral daily  QUEtiapine 50 milliGRAM(s) Oral at bedtime  rosuvastatin 10 milliGRAM(s) Oral at bedtime  senna 2 Tablet(s) Oral at bedtime  tamsulosin 0.4 milliGRAM(s) Oral two times a day    MEDICATIONS  (PRN):  acetaminophen     Tablet .. 650 milliGRAM(s) Oral every 6 hours PRN Temp greater or equal to 38C (100.4F), Mild Pain (1 - 3)        CAPILLARY BLOOD GLUCOSE        I&O's Summary    15 May 2025 07:01  -  16 May 2025 07:00  --------------------------------------------------------  IN: 500 mL / OUT: 750 mL / NET: -250 mL    16 May 2025 07:01  -  16 May 2025 12:05  --------------------------------------------------------  IN: 240 mL / OUT: 400 mL / NET: -160 mL        Vital Signs Last 24 Hrs  T(C): 36.7 (16 May 2025 08:55), Max: 37.7 (15 May 2025 21:25)  T(F): 98.1 (16 May 2025 08:55), Max: 99.9 (15 May 2025 21:25)  HR: 80 (16 May 2025 08:55) (70 - 86)  BP: 127/62 (16 May 2025 08:55) (96/56 - 159/79)  BP(mean): --  RR: 18 (16 May 2025 08:55) (18 - 19)  SpO2: 98% (16 May 2025 08:55) (95% - 98%)    PHYSICAL EXAM:  CHEST/LUNG: clear  HEART: S1 S2; no murmurs   ABDOMEN: Soft, Nontender  EXTREMITIES: no edema  NEUROLOGY: Alert non-focal    LABS:                        7.0    3.09  )-----------( 80       ( 16 May 2025 07:00 )             22.7     05-15    137  |  107  |  40[H]  ----------------------------<  98  4.3   |  16[L]  |  1.26    Ca    9.0      15 May 2025 07:11  Phos  3.0     05-15  Mg     2.2     05-15    TPro  5.5[L]  /  Alb  2.9[L]  /  TBili  0.2  /  DBili  x   /  AST  20  /  ALT  <5[L]  /  AlkPhos  341[H]  05-15    PT/INR - ( 16 May 2025 06:58 )   PT: 10.4 sec;   INR: 0.90 ratio         PTT - ( 16 May 2025 06:58 )  PTT:30.7 sec      Urinalysis Basic - ( 15 May 2025 07:11 )    Color: x / Appearance: x / SG: x / pH: x  Gluc: 98 mg/dL / Ketone: x  / Bili: x / Urobili: x   Blood: x / Protein: x / Nitrite: x   Leuk Esterase: x / RBC: x / WBC x   Sq Epi: x / Non Sq Epi: x / Bacteria: x          All consultant(s) notes reviewed and care discussed with other providers        Contact Number, Dr Fitzpatrick [phone]

## 2025-05-20 NOTE — PHARMACOTHERAPY INTERVENTION NOTE - COMMENTS
Author spoke with patient's daughter on 5/20/2024 who had additional questions regarding medications after discharge.    Reviewed with patient's daughter the indications for medications, which medications to stop and start, and most recent medication fill history including which pharmacy, provider, and quantity dispensed.  Patient daughter's questions were answered.    Юлия BuenrostroD, [facility]  Clinical Pharmacy Specialist  Available on Teams

## 2025-05-23 ENCOUNTER — NON-APPOINTMENT (OUTPATIENT)
Age: 85
End: 2025-05-23

## 2025-05-23 PROBLEM — I10 ESSENTIAL (PRIMARY) HYPERTENSION: Chronic | Status: ACTIVE | Noted: 2025-05-15

## 2025-05-23 PROBLEM — D64.9 ANEMIA, UNSPECIFIED: Chronic | Status: ACTIVE | Noted: 2025-05-15

## 2025-05-23 PROBLEM — Z86.59 PERSONAL HISTORY OF OTHER MENTAL AND BEHAVIORAL DISORDERS: Chronic | Status: ACTIVE | Noted: 2025-05-15

## 2025-05-23 PROBLEM — Z87.19 PERSONAL HISTORY OF OTHER DISEASES OF THE DIGESTIVE SYSTEM: Chronic | Status: ACTIVE | Noted: 2025-05-15

## 2025-05-23 PROBLEM — N45.3 EPIDIDYMO-ORCHITIS: Chronic | Status: ACTIVE | Noted: 2025-05-15

## 2025-05-23 PROBLEM — E78.5 HYPERLIPIDEMIA, UNSPECIFIED: Chronic | Status: ACTIVE | Noted: 2025-05-15

## 2025-05-23 PROBLEM — Z87.898 PERSONAL HISTORY OF OTHER SPECIFIED CONDITIONS: Chronic | Status: ACTIVE | Noted: 2025-05-15

## 2025-05-23 PROBLEM — M87.9 OSTEONECROSIS, UNSPECIFIED: Chronic | Status: ACTIVE | Noted: 2025-05-15

## 2025-05-23 PROBLEM — C61 MALIGNANT NEOPLASM OF PROSTATE: Chronic | Status: ACTIVE | Noted: 2025-05-15

## 2025-05-29 ENCOUNTER — NON-APPOINTMENT (OUTPATIENT)
Age: 85
End: 2025-05-29

## 2025-06-26 ENCOUNTER — APPOINTMENT (OUTPATIENT)
Dept: OTHER | Facility: CLINIC | Age: 85
End: 2025-06-26

## 2025-06-26 PROBLEM — C20: Status: ACTIVE | Noted: 2025-06-26

## 2025-06-26 PROBLEM — C61 CA PROSTATE, ADENOCA: Status: ACTIVE | Noted: 2025-06-26

## 2025-06-26 PROBLEM — C79.51 CANCER, METASTATIC TO BONE: Status: ACTIVE | Noted: 2025-06-26

## 2025-06-26 PROCEDURE — 99397 PER PM REEVAL EST PAT 65+ YR: CPT | Mod: 2W

## 2025-06-26 PROCEDURE — 99214 OFFICE O/P EST MOD 30 MIN: CPT | Mod: 2W,25

## 2025-07-29 ENCOUNTER — APPOINTMENT (OUTPATIENT)
Age: 85
End: 2025-07-29
Payer: MEDICARE

## 2025-07-29 ENCOUNTER — NON-APPOINTMENT (OUTPATIENT)
Age: 85
End: 2025-07-29

## 2025-07-29 PROCEDURE — 92083 EXTENDED VISUAL FIELD XM: CPT

## 2025-07-29 PROCEDURE — 92202 OPSCPY EXTND ON/MAC DRAW: CPT

## 2025-07-29 PROCEDURE — 92134 CPTRZ OPH DX IMG PST SGM RTA: CPT

## 2025-07-29 PROCEDURE — 92014 COMPRE OPH EXAM EST PT 1/>: CPT

## 2025-07-29 PROCEDURE — ZZZZZ: CPT

## 2025-08-27 ENCOUNTER — NON-APPOINTMENT (OUTPATIENT)
Age: 85
End: 2025-08-27